# Patient Record
Sex: FEMALE | Race: WHITE | NOT HISPANIC OR LATINO | Employment: OTHER | URBAN - METROPOLITAN AREA
[De-identification: names, ages, dates, MRNs, and addresses within clinical notes are randomized per-mention and may not be internally consistent; named-entity substitution may affect disease eponyms.]

---

## 2023-04-25 ENCOUNTER — OFFICE VISIT (OUTPATIENT)
Dept: PRIMARY CARE | Facility: CLINIC | Age: 73
End: 2023-04-25
Payer: MEDICARE

## 2023-04-25 ENCOUNTER — LAB (OUTPATIENT)
Dept: LAB | Facility: LAB | Age: 73
End: 2023-04-25
Payer: MEDICARE

## 2023-04-25 VITALS
TEMPERATURE: 97.2 F | SYSTOLIC BLOOD PRESSURE: 158 MMHG | HEIGHT: 62 IN | BODY MASS INDEX: 20.06 KG/M2 | RESPIRATION RATE: 16 BRPM | DIASTOLIC BLOOD PRESSURE: 80 MMHG | WEIGHT: 109 LBS

## 2023-04-25 DIAGNOSIS — Z00.00 HEALTHCARE MAINTENANCE: ICD-10-CM

## 2023-04-25 DIAGNOSIS — I10 PRIMARY HYPERTENSION: Primary | ICD-10-CM

## 2023-04-25 DIAGNOSIS — I10 PRIMARY HYPERTENSION: ICD-10-CM

## 2023-04-25 PROBLEM — H43.813 PVD (POSTERIOR VITREOUS DETACHMENT), BOTH EYES: Status: ACTIVE | Noted: 2023-04-25

## 2023-04-25 PROBLEM — Z85.3 HISTORY OF BREAST CANCER: Status: ACTIVE | Noted: 2023-04-25

## 2023-04-25 PROBLEM — R79.89 ABNORMAL CBC: Status: ACTIVE | Noted: 2023-04-25

## 2023-04-25 PROBLEM — R79.89 LOW VITAMIN D LEVEL: Status: ACTIVE | Noted: 2023-04-25

## 2023-04-25 PROBLEM — H52.203 MYOPIA OF BOTH EYES WITH ASTIGMATISM AND PRESBYOPIA: Status: ACTIVE | Noted: 2023-04-25

## 2023-04-25 PROBLEM — G57.60 MORTON'S NEUROMA: Status: ACTIVE | Noted: 2023-04-25

## 2023-04-25 PROBLEM — D75.89 MACROCYTOSIS: Status: ACTIVE | Noted: 2023-04-25

## 2023-04-25 PROBLEM — R10.11 COLICKY RUQ ABDOMINAL PAIN: Status: ACTIVE | Noted: 2023-04-25

## 2023-04-25 PROBLEM — E78.00 HYPERCHOLESTEROLEMIA: Status: ACTIVE | Noted: 2023-04-25

## 2023-04-25 PROBLEM — H52.4 MYOPIA OF BOTH EYES WITH ASTIGMATISM AND PRESBYOPIA: Status: ACTIVE | Noted: 2023-04-25

## 2023-04-25 PROBLEM — M79.673 FOOT PAIN: Status: ACTIVE | Noted: 2023-04-25

## 2023-04-25 PROBLEM — H25.13 NUCLEAR SCLEROSIS OF BOTH EYES: Status: ACTIVE | Noted: 2023-04-25

## 2023-04-25 PROBLEM — N95.2 VAGINAL ATROPHY: Status: ACTIVE | Noted: 2023-04-25

## 2023-04-25 PROBLEM — K21.9 GERD (GASTROESOPHAGEAL REFLUX DISEASE): Status: ACTIVE | Noted: 2023-04-25

## 2023-04-25 PROBLEM — L90.9 PLANTAR FAT PAD ATROPHY: Status: ACTIVE | Noted: 2023-04-25

## 2023-04-25 PROBLEM — H52.13 MYOPIA OF BOTH EYES WITH ASTIGMATISM AND PRESBYOPIA: Status: ACTIVE | Noted: 2023-04-25

## 2023-04-25 PROBLEM — Z85.828 HISTORY OF SKIN CANCER: Status: ACTIVE | Noted: 2023-04-25

## 2023-04-25 PROBLEM — E55.9 MILD VITAMIN D DEFICIENCY: Status: ACTIVE | Noted: 2023-04-25

## 2023-04-25 LAB
ALANINE AMINOTRANSFERASE (SGPT) (U/L) IN SER/PLAS: 18 U/L (ref 7–45)
ALBUMIN (G/DL) IN SER/PLAS: 4.5 G/DL (ref 3.4–5)
ALKALINE PHOSPHATASE (U/L) IN SER/PLAS: 49 U/L (ref 33–136)
ANION GAP IN SER/PLAS: 10 MMOL/L (ref 10–20)
ASPARTATE AMINOTRANSFERASE (SGOT) (U/L) IN SER/PLAS: 25 U/L (ref 9–39)
BASOPHILS (10*3/UL) IN BLOOD BY AUTOMATED COUNT: 0.02 X10E9/L (ref 0–0.1)
BASOPHILS/100 LEUKOCYTES IN BLOOD BY AUTOMATED COUNT: 0.6 % (ref 0–2)
BILIRUBIN TOTAL (MG/DL) IN SER/PLAS: 0.8 MG/DL (ref 0–1.2)
CALCIUM (MG/DL) IN SER/PLAS: 9.6 MG/DL (ref 8.6–10.3)
CARBON DIOXIDE, TOTAL (MMOL/L) IN SER/PLAS: 31 MMOL/L (ref 21–32)
CHLORIDE (MMOL/L) IN SER/PLAS: 98 MMOL/L (ref 98–107)
CHOLESTEROL (MG/DL) IN SER/PLAS: 257 MG/DL (ref 0–199)
CHOLESTEROL IN HDL (MG/DL) IN SER/PLAS: 121.8 MG/DL
CHOLESTEROL/HDL RATIO: 2.1
CREATININE (MG/DL) IN SER/PLAS: 0.84 MG/DL (ref 0.5–1.05)
EOSINOPHILS (10*3/UL) IN BLOOD BY AUTOMATED COUNT: 0.1 X10E9/L (ref 0–0.4)
EOSINOPHILS/100 LEUKOCYTES IN BLOOD BY AUTOMATED COUNT: 3 % (ref 0–6)
ERYTHROCYTE DISTRIBUTION WIDTH (RATIO) BY AUTOMATED COUNT: 12.2 % (ref 11.5–14.5)
ERYTHROCYTE MEAN CORPUSCULAR HEMOGLOBIN CONCENTRATION (G/DL) BY AUTOMATED: 32.6 G/DL (ref 32–36)
ERYTHROCYTE MEAN CORPUSCULAR VOLUME (FL) BY AUTOMATED COUNT: 105 FL (ref 80–100)
ERYTHROCYTES (10*6/UL) IN BLOOD BY AUTOMATED COUNT: 4.11 X10E12/L (ref 4–5.2)
GFR FEMALE: 73 ML/MIN/1.73M2
GLUCOSE (MG/DL) IN SER/PLAS: 100 MG/DL (ref 74–99)
HEMATOCRIT (%) IN BLOOD BY AUTOMATED COUNT: 43.3 % (ref 36–46)
HEMOGLOBIN (G/DL) IN BLOOD: 14.1 G/DL (ref 12–16)
IMMATURE GRANULOCYTES/100 LEUKOCYTES IN BLOOD BY AUTOMATED COUNT: 0.3 % (ref 0–0.9)
LDL: 127 MG/DL (ref 0–99)
LEUKOCYTES (10*3/UL) IN BLOOD BY AUTOMATED COUNT: 3.4 X10E9/L (ref 4.4–11.3)
LYMPHOCYTES (10*3/UL) IN BLOOD BY AUTOMATED COUNT: 1.14 X10E9/L (ref 0.8–3)
LYMPHOCYTES/100 LEUKOCYTES IN BLOOD BY AUTOMATED COUNT: 34 % (ref 13–44)
MONOCYTES (10*3/UL) IN BLOOD BY AUTOMATED COUNT: 0.41 X10E9/L (ref 0.05–0.8)
MONOCYTES/100 LEUKOCYTES IN BLOOD BY AUTOMATED COUNT: 12.2 % (ref 2–10)
NEUTROPHILS (10*3/UL) IN BLOOD BY AUTOMATED COUNT: 1.67 X10E9/L (ref 1.6–5.5)
NEUTROPHILS/100 LEUKOCYTES IN BLOOD BY AUTOMATED COUNT: 49.9 % (ref 40–80)
PLATELETS (10*3/UL) IN BLOOD AUTOMATED COUNT: 258 X10E9/L (ref 150–450)
POTASSIUM (MMOL/L) IN SER/PLAS: 4.1 MMOL/L (ref 3.5–5.3)
PROTEIN TOTAL: 7.2 G/DL (ref 6.4–8.2)
SODIUM (MMOL/L) IN SER/PLAS: 135 MMOL/L (ref 136–145)
THYROTROPIN (MIU/L) IN SER/PLAS BY DETECTION LIMIT <= 0.05 MIU/L: 1.37 MIU/L (ref 0.44–3.98)
TRIGLYCERIDE (MG/DL) IN SER/PLAS: 41 MG/DL (ref 0–149)
UREA NITROGEN (MG/DL) IN SER/PLAS: 16 MG/DL (ref 6–23)
VLDL: 8 MG/DL (ref 0–40)

## 2023-04-25 PROCEDURE — 99213 OFFICE O/P EST LOW 20 MIN: CPT | Performed by: INTERNAL MEDICINE

## 2023-04-25 PROCEDURE — 93000 ELECTROCARDIOGRAM COMPLETE: CPT | Performed by: INTERNAL MEDICINE

## 2023-04-25 PROCEDURE — 3079F DIAST BP 80-89 MM HG: CPT | Performed by: INTERNAL MEDICINE

## 2023-04-25 PROCEDURE — 80061 LIPID PANEL: CPT

## 2023-04-25 PROCEDURE — 1159F MED LIST DOCD IN RCRD: CPT | Performed by: INTERNAL MEDICINE

## 2023-04-25 PROCEDURE — 1036F TOBACCO NON-USER: CPT | Performed by: INTERNAL MEDICINE

## 2023-04-25 PROCEDURE — 84443 ASSAY THYROID STIM HORMONE: CPT

## 2023-04-25 PROCEDURE — 36415 COLL VENOUS BLD VENIPUNCTURE: CPT

## 2023-04-25 PROCEDURE — 3077F SYST BP >= 140 MM HG: CPT | Performed by: INTERNAL MEDICINE

## 2023-04-25 PROCEDURE — G0439 PPPS, SUBSEQ VISIT: HCPCS | Performed by: INTERNAL MEDICINE

## 2023-04-25 PROCEDURE — 80053 COMPREHEN METABOLIC PANEL: CPT

## 2023-04-25 PROCEDURE — 1170F FXNL STATUS ASSESSED: CPT | Performed by: INTERNAL MEDICINE

## 2023-04-25 PROCEDURE — 85025 COMPLETE CBC W/AUTO DIFF WBC: CPT

## 2023-04-25 PROCEDURE — 1160F RVW MEDS BY RX/DR IN RCRD: CPT | Performed by: INTERNAL MEDICINE

## 2023-04-25 RX ORDER — OMEPRAZOLE 40 MG/1
40 CAPSULE, DELAYED RELEASE ORAL
COMMUNITY
Start: 2023-02-07 | End: 2023-08-09 | Stop reason: SDUPTHER

## 2023-04-25 RX ORDER — ESTRADIOL 0.1 MG/G
CREAM VAGINAL
COMMUNITY
Start: 2017-09-08 | End: 2024-03-18 | Stop reason: SDUPTHER

## 2023-04-25 RX ORDER — ACETAMINOPHEN 500 MG
TABLET ORAL
COMMUNITY

## 2023-04-25 ASSESSMENT — ENCOUNTER SYMPTOMS
DIARRHEA: 0
BLOOD IN STOOL: 0
ACTIVITY CHANGE: 0
CONSTIPATION: 0
SHORTNESS OF BREATH: 0
ARTHRALGIAS: 0
ABDOMINAL PAIN: 0

## 2023-04-25 ASSESSMENT — ACTIVITIES OF DAILY LIVING (ADL)
BATHING: INDEPENDENT
DRESSING: INDEPENDENT
GROCERY_SHOPPING: INDEPENDENT
TAKING_MEDICATION: INDEPENDENT
DOING_HOUSEWORK: INDEPENDENT
MANAGING_FINANCES: INDEPENDENT

## 2023-04-25 ASSESSMENT — PATIENT HEALTH QUESTIONNAIRE - PHQ9
1. LITTLE INTEREST OR PLEASURE IN DOING THINGS: NOT AT ALL
SUM OF ALL RESPONSES TO PHQ9 QUESTIONS 1 AND 2: 0
2. FEELING DOWN, DEPRESSED OR HOPELESS: NOT AT ALL

## 2023-04-25 NOTE — PROGRESS NOTES
Patient here for a Medicare Wellness visit and follow up    Subjective   Patient ID: Natalee Clemens is a 72 y.o. female who presents for Follow-up and Medicare Annual Wellness Visit Subsequent.    The patient is concerned about her cognitive health, and requests a test to establish her baseline.    The patient has a sphygmomanometer at home and notes that the majority of readings are within the normal range with yesterday's value being 120/70 mmHg.  She denies any chest pain or chest pressure.    The patient is currently looking for a referral to Obstetrics/Gynecology, as she was discharged by Alexandra BARNARD.    She is checking her BP at home.  She is generally getting very good readings.  Systolic was in the 120s a few days ago.      The patient experienced one bout of Covid-19 that was successfully treated with Paxlovid.  She has since recovered and denies any dyspnea.    The patient denies any hearing impairment or vision changes and follows regularly with Ophthalmology.  She reports relatively good sleep quality and energy levels.  She denies any abdominal pain, hematochezia, melena, bowel problems, or joint pain.     The patient is grieving the recent loss of her sister who passed away from complications due to dementia, COPD, and cardiac disease.      Review of Systems   Constitutional:  Negative for activity change.   HENT:  Negative for hearing loss.    Eyes:  Negative for visual disturbance.   Respiratory:  Negative for shortness of breath.    Cardiovascular:  Negative for chest pain.   Gastrointestinal:  Negative for abdominal pain, blood in stool, constipation and diarrhea.   Musculoskeletal:  Negative for arthralgias.       Objective   Physical Exam  Constitutional:       Appearance: Normal appearance.   Cardiovascular:      Rate and Rhythm: Normal rate and regular rhythm.      Heart sounds: Normal heart sounds.   Pulmonary:      Effort: Pulmonary effort is normal.      Breath sounds: Normal  breath sounds.   Abdominal:      General: Bowel sounds are normal.      Palpations: Abdomen is soft.      Tenderness: There is no abdominal tenderness.   Skin:     General: Skin is warm and dry.   Neurological:      General: No focal deficit present.      Mental Status: She is alert and oriented to person, place, and time. Mental status is at baseline.   Psychiatric:         Mood and Affect: Mood normal.         Behavior: Behavior normal.         Assessment/Plan   Problem List Items Addressed This Visit       Hypertension - Primary    Relevant Orders    Tsh With Reflex To Free T4 If Abnormal    Lipid panel    Comprehensive metabolic panel    CBC and Auto Differential    ECG 12 lead (Clinic Performed)     Other Visit Diagnoses       Healthcare maintenance        Relevant Orders    Referral to Gynecology            Medicare Wellness Examination Done  -  Discussed healthy diet and regular exercise.    -  Physical exam overall unremarkable. Immunizations reviewed and updated accordingly. Healthy lifestyle choices discussed (tobacco avoidance, appropriate alcohol use, avoidance of illicit substances).   -  Patient is wearing seatbelt.   -  Screening lab work ordered as indicated.    -  Age appropriate screening tests reviewed with patient.       EKG was unremarkable.    IMPRESSIONS/PLAN:    Hx of Breast Cancer   - s/p right lumpectomy.  Last Mammogram 10/2022 showed no evidence of malignancy.  MRI breast 4/2022 showed suspicious 1.5cm mass at lumpectomy surgical bed with additional suspicious mass anterior at middle depth.  Previously followed with AKIL Canales in Oncology.  Ordered referral to Obstetrics/Gynecology.    /80 today in office.  Likely aberrant reading.  Values at home have been within the normal range. Monitor for now.     HLD   - Cholesterol borderline high per 8/2022 lipid panel, patient advised to continue to lower levels through healthy diet and regular physical activity.      GERD   - EGD  3/2022, biopsies negative, symptoms stable on omeprazole 40mg QD.    Foot Pain   - Following with .     Health Maintenance   - Routine labs ordered including CBC, CMP, and a lipid panel to be completed in the fasting state.  Added TSH. Last Mammogram 10/2022. Last colonoscopy performed 4/2018, due for repeat 2028.  Scored 30/30 on MMSE per patient request.     Follow up in 6 months, call sooner if needed.        Scribe Attestation  By signing my name below, I, Robert Rodgers   attest that this documentation has been prepared under the direction and in the presence of Vignesh Martinez DO.

## 2023-08-09 DIAGNOSIS — K21.9 GASTROESOPHAGEAL REFLUX DISEASE WITHOUT ESOPHAGITIS: Primary | ICD-10-CM

## 2023-08-09 RX ORDER — OMEPRAZOLE 40 MG/1
40 CAPSULE, DELAYED RELEASE ORAL
Qty: 90 CAPSULE | Refills: 3 | Status: SHIPPED | OUTPATIENT
Start: 2023-08-09 | End: 2023-08-10 | Stop reason: SDUPTHER

## 2023-08-10 DIAGNOSIS — K21.9 GASTROESOPHAGEAL REFLUX DISEASE WITHOUT ESOPHAGITIS: ICD-10-CM

## 2023-08-10 RX ORDER — OMEPRAZOLE 40 MG/1
40 CAPSULE, DELAYED RELEASE ORAL
Qty: 90 CAPSULE | Refills: 3 | Status: SHIPPED | OUTPATIENT
Start: 2023-08-10

## 2023-09-11 LAB
RBC, URINE: 1 /HPF (ref 0–5)
SQUAMOUS EPITHELIAL CELLS, URINE: 2 /HPF
WBC, URINE: 1 /HPF (ref 0–5)

## 2023-09-12 LAB — URINE CULTURE: NO GROWTH

## 2023-10-16 ENCOUNTER — TELEPHONE (OUTPATIENT)
Dept: OBSTETRICS AND GYNECOLOGY | Facility: CLINIC | Age: 73
End: 2023-10-16

## 2023-10-26 ENCOUNTER — ANCILLARY PROCEDURE (OUTPATIENT)
Dept: RADIOLOGY | Facility: CLINIC | Age: 73
End: 2023-10-26
Payer: MEDICARE

## 2023-10-26 DIAGNOSIS — Z12.31 ENCOUNTER FOR SCREENING MAMMOGRAM FOR MALIGNANT NEOPLASM OF BREAST: ICD-10-CM

## 2023-10-26 PROCEDURE — 77063 BREAST TOMOSYNTHESIS BI: CPT | Mod: BILATERAL PROCEDURE | Performed by: STUDENT IN AN ORGANIZED HEALTH CARE EDUCATION/TRAINING PROGRAM

## 2023-10-26 PROCEDURE — 77067 SCR MAMMO BI INCL CAD: CPT

## 2023-10-26 PROCEDURE — 77067 SCR MAMMO BI INCL CAD: CPT | Mod: BILATERAL PROCEDURE | Performed by: STUDENT IN AN ORGANIZED HEALTH CARE EDUCATION/TRAINING PROGRAM

## 2023-10-27 ENCOUNTER — TELEPHONE (OUTPATIENT)
Dept: OBSTETRICS AND GYNECOLOGY | Facility: CLINIC | Age: 73
End: 2023-10-27
Payer: MEDICARE

## 2023-10-27 NOTE — TELEPHONE ENCOUNTER
Office verbally called Rx for estradiol vaginal cream into pt pharmacy. 42.5g tube with 3RF's. Sig: insert 1g vaginally twice a week. LDVM informing pt. Advised to contact pharm for  today.

## 2023-11-30 ENCOUNTER — TREATMENT (OUTPATIENT)
Dept: PHYSICAL THERAPY | Facility: CLINIC | Age: 73
End: 2023-11-30
Payer: MEDICARE

## 2023-11-30 DIAGNOSIS — N39.41 URGE INCONTINENCE: ICD-10-CM

## 2023-11-30 DIAGNOSIS — R39.15 URINARY URGENCY: Primary | ICD-10-CM

## 2023-11-30 PROCEDURE — 97162 PT EVAL MOD COMPLEX 30 MIN: CPT | Mod: GP | Performed by: PHYSICAL THERAPIST

## 2023-11-30 PROCEDURE — 97140 MANUAL THERAPY 1/> REGIONS: CPT | Mod: GP | Performed by: PHYSICAL THERAPIST

## 2023-11-30 PROCEDURE — 97110 THERAPEUTIC EXERCISES: CPT | Mod: GP | Performed by: PHYSICAL THERAPIST

## 2023-11-30 NOTE — PROGRESS NOTES
Physical Therapy Evaluation and Treatment      Patient Name: Natalee Clemens  MRN: [id number]  Today's Date: 12/1/2023  Time Calculation  Start Time: 1015  Stop Time: 1120  Time Calculation (min): 65 min      INSURANCE:  [place]  [facility]    Non-Surgical Referring Provider: Dr. Miriam Singer    ASSESSMENT:  PT Assessment Results: decreased knowledge of HEP, activity limitations, ADLs/IADLs/self care skills, flexibility, motor function/control/tone, pain, participation restrictions, range of motion/joint mobility, strength, posture, transfers, coordination, balance, gait/locomotion.  Rehab Prognosis: Good  Evaluation/Treatment Tolerance: Patient tolerated treatment well    Nataele Clemens is a 73 y.o. female presenting to the clinic with Urinary urgency and incontinence. Noted vaginal atrophy upon examination as well and educated to continue using estradiol cream prescribed by her physician. Pt demonstrates decreased ROM and strength of the pelvis/B hips and abdominals causing pain and dysfunction with toileting, bending, and lifting. Pt was given and reviewed HEP including stretching. Pt will benefit from skilled PT in order to increase ROM and strength of the pelvis/B hips and abdominals so that the pt can perform ADLs without pain and return to PLOF.     PLAN:  Treatments/Interventions: gait training, dry needling, edema control, education/instruction, home program, self care/home management, manual therapy, neuromuscular re-education, therapeutic activities, therapeutic exercises, modalities, therapeutic elastic taping.   PT Plan: Skilled PT  Duration: 1-2 times per week for 10 visits  Certification Period Start Date: 11/30/23  Certification Period End Date: 02/28/24  Rehab Potential: Good  Plan of Care Agreement: Patient    Goals -    STG - After about 6 weeks:  Pt will be able to manage changes in intra-abdominal pressure with appropriate pelvic floor and TA muscle activation.  Pt will be able to  "coordinate pelvic floor with thoracic diaphragm during functional activities that cause symptoms.  Pt will increase by 1 MMT grade for B hips.  Pt will decrease urinary leakage episodes to less than once a day.    LTG - after about 12 weeks:  Pt will report less than a 0-2/10 pain at the worst.  Pt will have at least 4+/5 strength for B hips.   Pt will have AROM to WFL for the lumbar spine.   Pt will decrease urinary leakage episodes to less than once a week.  Pt will report a significant improvement with Female NIH-CPSI score by 5 points (MDC).  Pt will be independent with progressed HEP.    CURRENT PROBLEM:   1. Urinary urgency  Follow Up In Physical Therapy      2. Urge incontinence  Follow Up In Physical Therapy          Subjective      PELVIC HISTORY:  History of Current Episode/Date Onset -   Pt states that she has noticed that she has had a lot more urgency lately with urination. Pt states that she sometimes does not make it to the bathroom. Pt reports that it has been on and off for the past 5 years. Pt now has to wear a pad and her symptoms are always worse in the morning, and that she usually has to get up at about 4 in the morning with symptoms. Pt does report that she does make \"just in case trips to the bathroom\". Pt does have \"Key in the door\" urgency as well.    Mechanism of Injury -    Insidious Onset    Pelvic Pain -   Since onset, the symptoms are:   Sometimes her clitoris can be irritated  Is on estrogen cream 2x a week  History of back pain: Yes, but better since retired    Pain Location: Lumbar   Pain Best: 0/10  Pain Today: 0/10  Pain Worst: 2/10   Pain Type: Intermittent, Achy/Dull  Lifting or Gardening    Pelvic Exercise -   Do you do Kegels? No   Current exercise regime: Gama, walking    Bladder Hx -   Excessive Urinary Urgency: Yes  Water Consumption a day: 5-6 cups (2 cups of coffee in the morning)   Daytime Voiding Frequency: morning 6 times, 3 times afternoon  Nighttime Voiding " Frequency: 1-2 times  Unintentional urine loss frequency: 1-2 times a day (in the morning)  Leakage occurs with: urgency, key in door, running water  Leakage amount: small  Do you push to urinate: No  Able to completely empty bladder: Yes  Frequent UTI's: No    Bowel Hx -   Excessive Bowel Urgency: No  BM Frequency: Mostly once a day  Frequent Diarrhea: No  Frequent constipation/straining/incomplete emptying: Sometimes  Supplements: Flaxseed    Work -   Work Status: Retired    Home Living -    Stairs in Home: 1-2 flight with railing  Pt lives with her     Patient Stated Goals - Control her urine better    PRECAUTIONS -    None    Prior Level of Function -    I with ADLs/IADLs     Objective     Hip AROM (degrees) - WFL grossly except IR and extension    Hip MMT (/5) -  R hip Flexion: 4   R hip ER: 3+   R hip IR: 4-   R hip Abduction: 4-   L hip Flexion: 4    L hip ER: 3+   L hip IR: 4-   L hip Abduction: 4-     Lumbar AROM by Percent -  Lumbar Flexion: 80%   Lumbar Extension: 30%  R Lumbar Side Bendin%  L Lumbar Side Bendin%    Posture -   Increased Lumbar Lordosis  Sway Back Posture    Functional Assessment -   SLS: Trendelenburg positive B    Hip/Lumbar Special Tests -  PEPITO positive bilateral with pain  Ramon positive bilateral  Slump negative bilateral  Active SLR: Compression Pelvis more difficulty, Compression Abdominals improved    Flexibility -   Decreased Tissue Length of: Iliopsoas, Adductors, HS (B)    Palpation - Consent to External Palpation Verbally Given  Pelvic Alignment: R anteriorly rotated innominate - improved with Shotgun/MET    Internal Palpation Exam - Consent to Pelvic Floor Internal Exam Verbally Given, Performed using universal precautions/gloves    Pelvic Floor External Visual Observation -   Contraction with Anal [place]: reduced  Bearing Down with distension around anus: reduced  Cough with Contraction: Slight Bulge (Pt was taught a pre-activation of pelvic  floor/Knack)    External Palpation -   Perineal Body: no TTP  Superficial Transverse Perineal Muscles: (R) no TTP; (L) no TTP    Visual Observation -   Good Tissue Color: Yes  Guillory of Clitoris has good movement: No, noted vaginal atrophy  Alignment of Perineal Body in relation to ischial tuberosities: middle    Internal Palpation -   Ischiocavernosus: (R) mild TTP; (L) mild TTP  Bulbocavernosus: (R) mild TTP; (L) moderate TTP  Periurethrals: (R) mild TTP; (L) moderate TTP  Levator Ani: (R) mild TTP; (L) moderate TTP  Obturator Internus: (R) mild TTP; (L) moderate TTP    Pelvic Floor Strength - P / E / R // F   Power MVC: 3 /5  Endurance MVC (up to 10 sec): 2 sec  Repetitions of Endurance MVC with 4 second break: 4  Fast Twitch (up to 10 reps): dnt    Outcome Measures -   Other Measures  Other Outcome Measures: Female NIH-CPSI: 16 (Pain 3, Urinary 8, QOL 5)     Treatment -   Evaluation -   Moderate (91714)  Therapeutic Exercise (42574) -     Piriformis Stretch: Reviewed  Butterfly Stretch: Reviewed  Reviewed Urge Control pointers  Manual Therapy (88217) -    Internal Assessment with gentle TPR    OP Patient Education -   Pt was given handouts for Urge Control, Bladder Irritants, and HEP with piriformis/butterfly stretches.

## 2023-12-01 PROBLEM — N39.41 URGE INCONTINENCE: Status: ACTIVE | Noted: 2023-12-01

## 2023-12-01 PROBLEM — R39.15 URINARY URGENCY: Status: ACTIVE | Noted: 2023-12-01

## 2023-12-01 ASSESSMENT — PATIENT HEALTH QUESTIONNAIRE - PHQ9
SUM OF ALL RESPONSES TO PHQ9 QUESTIONS 1 AND 2: 0
1. LITTLE INTEREST OR PLEASURE IN DOING THINGS: NOT AT ALL
2. FEELING DOWN, DEPRESSED OR HOPELESS: NOT AT ALL

## 2023-12-11 ENCOUNTER — TREATMENT (OUTPATIENT)
Dept: PHYSICAL THERAPY | Facility: CLINIC | Age: 73
End: 2023-12-11
Payer: MEDICARE

## 2023-12-11 DIAGNOSIS — N39.41 URGE INCONTINENCE: Primary | ICD-10-CM

## 2023-12-11 PROCEDURE — 97110 THERAPEUTIC EXERCISES: CPT | Mod: GP,CQ

## 2023-12-11 ASSESSMENT — PAIN SCALES - GENERAL: PAINLEVEL_OUTOF10: 0 - NO PAIN

## 2023-12-11 ASSESSMENT — PAIN - FUNCTIONAL ASSESSMENT: PAIN_FUNCTIONAL_ASSESSMENT: 0-10

## 2023-12-11 NOTE — PROGRESS NOTES
Physical Therapy Treatment    Patient Name: Natalee Clemens  MRN: [id number]  Today's Date: 12/11/2023  Time Calculation  Start Time: 0703  Stop Time: 0745  Time Calculation (min): 42 min  Insurance   [place] 2[place]  [facility] [place]  Current Problem:  1. Urge incontinence          Subjective:  I have been doing my exercises and I am not sure if I am doing the leg cross over stretch correct.  Pain:  Pain Assessment: 0-10  Pain Score: 0 - No pain    Objective:  Static Balance  R SLS ~ 2 sec with dynamic valgus  L SLS  ~  3sec with dynamic valgus  Precautions  Precautions  Medical Precautions: No known precautions/limitation    Treatments:  Therapeutic Exercise 74221: Unit(s)-3  Supine Hipflexor x 30sec R/L x 2  Supine Posterior Pelvic Tilt  3 position bridge x 5 each  Weight shifting forward/backward stepping x 10 R/L and L/R   Supine TA with BKFO  x10  SL Clamshell x 10 R/L  Quadruped TA 1x10  Quadruped TA with Bird dog 1x10  Reviewed and Updated HEP  Assessment: Patient demonstrated good exercise technique and improved mobility following session.  Plan: Continue to work on improving SLS control.

## 2023-12-19 ENCOUNTER — TREATMENT (OUTPATIENT)
Dept: PHYSICAL THERAPY | Facility: CLINIC | Age: 73
End: 2023-12-19
Payer: MEDICARE

## 2023-12-19 DIAGNOSIS — N39.41 URGE INCONTINENCE: Primary | ICD-10-CM

## 2023-12-19 PROCEDURE — 97140 MANUAL THERAPY 1/> REGIONS: CPT | Mod: CQ,GP

## 2023-12-19 ASSESSMENT — PAIN - FUNCTIONAL ASSESSMENT: PAIN_FUNCTIONAL_ASSESSMENT: 0-10

## 2023-12-19 ASSESSMENT — PAIN SCALES - GENERAL: PAINLEVEL_OUTOF10: 0 - NO PAIN

## 2023-12-19 NOTE — PROGRESS NOTES
"      Physical Therapy Treatment    Patient Name: Natalee Clemens  MRN: [id number]  Today's Date: 12/19/2023  Time Calculation  Start Time: 1216  Stop Time: 1301  Time Calculation (min): 45 min  Insurance   Visit number: 3/10  [facility]  Current Problem:  1. Urge incontinence            Subjective:  I have been doing the hip flexor stretch on my bed and its not firm enough.  Pain:  Pain Assessment: 0-10  Pain Score: 0 - No pain    Objective:  Not tested  Precautions  Precautions  Medical Precautions: No known precautions/limitation  Treatments:  Therapeutic Exercise 85163: Unit(s)-3  Bike-Recumbent x 6min [place] with TA  TG B LE Leg Press level 6 2x10 SDR 4  SLS with contralateral hip abd/er isometric x 5 x5\" hold  R/L  Lateral Step Up/Down 4\" block x10 R/L  Standing Sumo Squats x 10 x 5\"hold  SLS on Airex x 15\"x3 R/L  3 position bridge x5 each  SL Clamshell x 10 R/L  SL Hip Abd 1x10 R/L  Quadruped TA with Bird dog 1x10  Assessment: Patient demonstrated good technique with exercises. Patient was challenged with SLS activities.  Plan: Continue to work on improving SLS control and endurance.  "

## 2023-12-28 ENCOUNTER — TREATMENT (OUTPATIENT)
Dept: PHYSICAL THERAPY | Facility: CLINIC | Age: 73
End: 2023-12-28
Payer: MEDICARE

## 2023-12-28 DIAGNOSIS — R39.15 URINARY URGENCY: ICD-10-CM

## 2023-12-28 DIAGNOSIS — N39.41 URGE INCONTINENCE: ICD-10-CM

## 2023-12-28 PROCEDURE — 97112 NEUROMUSCULAR REEDUCATION: CPT | Mod: GP | Performed by: PHYSICAL THERAPIST

## 2023-12-28 PROCEDURE — 97140 MANUAL THERAPY 1/> REGIONS: CPT | Mod: GP | Performed by: PHYSICAL THERAPIST

## 2023-12-28 NOTE — PROGRESS NOTES
Physical Therapy Treatment    Patient Name: Natalee Clemens  MRN: [id number]  Today's Date: 12/28/2023  Time Calculation  Start Time: 1110  Stop Time: 1212  Time Calculation (min): 62 min    INSURANCE:  Visit number: 4/10  [facility]    Non-Surgical   Referring Provider: Dr. Miriam Singer     CURRENT PROBLEM:  1. Urinary urgency  Follow Up In Physical Therapy      2. Urge incontinence  Follow Up In Physical Therapy        SUBJECTIVE:   General -   Pt is doing home exercises.  Pretty good   Urge control handout is helping a lot  Hang of the exercises     Feels like she is getting stronger with her other exercises    No leaking this week   Little better with the urgency, now more moderate urges    Pain -    No pain, just some vaginal dryness    Precautions -    None    OBJECTIVE:   Treatment -   Manual Therapy (27179) -    Gentle TPR to ischiocavernosus, bulbocavernosus, and paraurethrals  Neuro Reeducation (41435) -    Prometheus CTS 1500 BioFeedback -   Baseline Supine: 1.8  5 sec Work, 10 sec Rest (20 times): Av Rise 7.0, Av Rest 1.7  2 sec Work, 4 sec Rest (20 times): Av Rise 8.8, Av Rest 2.9  Attempted E-stim, but pt reported felt uncomfortable so held    OP Patient Education -   Lubricants Handout    ASSESSMENT:     TPR performed to pelvic floor musculature with good tolerance. Pt was introduced to biofeedback which she tolerated well, but did not like e-stim so held. Pt was given and reviewed a lubricants handout for reduced dryness during intercourse. Pt is benefiting from strengthening exercises. Pt tolerated session well and is progressing towards functional needs. Continue to progress pt within tolerance and POC.    PLAN:    Continue to progress pt within tolerance.

## 2023-12-29 ENCOUNTER — TREATMENT (OUTPATIENT)
Dept: PHYSICAL THERAPY | Facility: CLINIC | Age: 73
End: 2023-12-29
Payer: MEDICARE

## 2023-12-29 DIAGNOSIS — R39.15 URINARY URGENCY: ICD-10-CM

## 2023-12-29 DIAGNOSIS — N39.41 URGE INCONTINENCE: ICD-10-CM

## 2023-12-29 PROCEDURE — 97110 THERAPEUTIC EXERCISES: CPT | Mod: GP,CQ

## 2023-12-29 ASSESSMENT — PAIN - FUNCTIONAL ASSESSMENT: PAIN_FUNCTIONAL_ASSESSMENT: 0-10

## 2023-12-29 ASSESSMENT — PAIN SCALES - GENERAL: PAINLEVEL_OUTOF10: 0 - NO PAIN

## 2023-12-29 NOTE — PROGRESS NOTES
"      Physical Therapy Treatment    Patient Name: Natalee Clemens  MRN: [id number]  Today's Date: 12/29/2023  Time Calculation  Start Time: 0747  Stop Time: 0830  Time Calculation (min): 43 min  Insurance   [facility]: 5/[facility] [place]  Current Problem:  1. Urinary urgency  Follow Up In Physical Therapy      2. Urge incontinence  Follow Up In Physical Therapy          Subjective:  I have been busy around the holidays and have house guests, so I have not been regular with HEP  Pain:  Pain Assessment: 0-10  Pain Score: 0 - No pain    Objective:  Balance Static  R SLS~3sec  L SLS ~3sec  With dynamic valgus present.  Precautions  Precautions  Medical Precautions: No known precautions/limitation  Treatments:  Therapeutic Exercise 98566: Unit(s)-3  Bike-Recumbent x 6min [place] with TA  TG B LE Leg Press level 6 3x10 SDR 4 with isometric hip abd vs red band  Lateral Step Up/Down 4\" block x10x2 R/L  Standing Sumo Squats x 15 x 5\"hold  Standing Hipflexor stretch on chair x 30 sec R/L  SLS on Airex x 30\"x3 R/L  SLS with contralateral hip abd/er isometric x5 x5\" hold  3 position bridge x5 each  SL Clamshell x 10 R/L  SL Hip Abd 1x10 R/L  SL Hip ER 1x10 R/L  Assessment: Patient was challenged with SLS activities and with bridges.  Plan: Continue to work on improving SLS and dynamic control.   "

## 2024-01-04 ENCOUNTER — TREATMENT (OUTPATIENT)
Dept: PHYSICAL THERAPY | Facility: CLINIC | Age: 74
End: 2024-01-04
Payer: MEDICARE

## 2024-01-04 DIAGNOSIS — N39.41 URGE INCONTINENCE: ICD-10-CM

## 2024-01-04 DIAGNOSIS — R39.15 URINARY URGENCY: ICD-10-CM

## 2024-01-04 PROCEDURE — 97140 MANUAL THERAPY 1/> REGIONS: CPT | Mod: GP | Performed by: PHYSICAL THERAPIST

## 2024-01-04 PROCEDURE — 97110 THERAPEUTIC EXERCISES: CPT | Mod: GP | Performed by: PHYSICAL THERAPIST

## 2024-01-04 PROCEDURE — 97112 NEUROMUSCULAR REEDUCATION: CPT | Mod: GP | Performed by: PHYSICAL THERAPIST

## 2024-01-04 NOTE — PROGRESS NOTES
Physical Therapy Treatment    Patient Name: Natalee Clemens  MRN: [id number]  Today's Date: 1/4/2024  Time Calculation  Start Time: 1006  Stop Time: 1110  Time Calculation (min): 64 min    INSURANCE:  Visit number: 6/10  [facility]    Referring Provider: Dr. Miriam Singer     CURRENT PROBLEM:  1. Urinary urgency  Follow Up In Physical Therapy      2. Urge incontinence  Follow Up In Physical Therapy        SUBJECTIVE:   General -   Pt is doing home exercises.  Soreness after last visit, did not like the stim.  Tried a different lubricant which was more slippery.  Feels like she is walking more upright with better strength.    Pain -    Pain Location: Irritated Vaginal Tissue (0-1/10), B hips muscle soreness from exercises.    Precautions -    None    OBJECTIVE:   Treatment -   Therapeutic Exercise (88530) -  Hip IR/ER Wipers   TA press isometric: 5 sec holds, 1 min  Seated TA B hip AB with ball: RTB x10  Supine Happy Baby: 1 min  Manual Therapy (99202) -    Internal gentle TPR to B bulbocavernosus and periurethrals, and L obturator internus  Neuro Reeducation (39789) -    Prometheus CTS 1500 BioFeedback -   Baseline Supine: 3.7  5 sec Work, 10 sec Rest (20 times): Av Rise 9.9, Av Rest 3.5  2 sec Work, 4 sec Rest (20 times): Av Rise 11.6, Av Rest 4.8  5 sec Work, 10 sec Rest (20 times): Av Rise 10.5, Av Rest 2.9    OP Patient Education -   Access Code: [email or web address]  URL: [email or web address]  Date: 01/04/2024  Prepared by: Fabiana Saha    Exercises  - Supine Hip Internal and External Rotation  - 1-2 x daily - 2 sets - 10 reps - 2-3 sec hold  - Abdominal Press into Ball  - 1-2 x daily - 5 sec hold  - Seated Hip Internal Rotation with Ball and Resistance  - 1-2 x daily - 2-3 sets - 10 reps - 2 sec hold  - Seated Happy Baby With Trunk Flexion For Pelvic Relaxation  - 1-2 x daily - 1 min hold    ASSESSMENT:     Pt was introduced to more hip stretching and hip/abdominal exercises for home. Internal gentle TPR  performed for reduced spasm, which more noted on the L side today. Pt was taught happy baby positions with breath for improved pelvic floor relaxation. Continued with biofeedback this visit, which she required cueing for sustained holds and improved pelvic floor relaxation. Pt tolerated session well and is progressing towards functional needs. Continue to progress pt within tolerance and POC.    PLAN:    Continue to progress pt within tolerance.

## 2024-01-05 ENCOUNTER — TREATMENT (OUTPATIENT)
Dept: PHYSICAL THERAPY | Facility: CLINIC | Age: 74
End: 2024-01-05
Payer: MEDICARE

## 2024-01-05 DIAGNOSIS — N39.41 URGE INCONTINENCE: Primary | ICD-10-CM

## 2024-01-05 DIAGNOSIS — R39.15 URINARY URGENCY: ICD-10-CM

## 2024-01-05 PROCEDURE — 97110 THERAPEUTIC EXERCISES: CPT | Mod: GP,CQ

## 2024-01-05 ASSESSMENT — PAIN SCALES - GENERAL: PAINLEVEL_OUTOF10: 0 - NO PAIN

## 2024-01-05 ASSESSMENT — PAIN - FUNCTIONAL ASSESSMENT: PAIN_FUNCTIONAL_ASSESSMENT: 0-10

## 2024-01-05 NOTE — PROGRESS NOTES
"      Physical Therapy Treatment    Patient Name: Natalee Clemens  MRN: [id number]  Today's Date: 1/5/2024     Insurance   [facility] [place]  Current Problem:  1. Urge incontinence  Follow Up In Physical Therapy      2. Urinary urgency  Follow Up In Physical Therapy        Subjective:  My hip has been feeling pretty good.  I have some soreness at times but resolves with rest.  Pain:  Pain Assessment: 0-10  Pain Score: 0 - No pain  Objective:  Dynamic Balance  R Forward Step and Hold~ 3sec  L Forward Step and Hold ~ 2sec  With dynamic valgus present.  Precautions  Precautions  Medical Precautions: No known precautions/limitation  Treatments:  Therapeutic Exercise 09339: Unit(s)-3  Bike-Recumbent x 6min [place] with TA  TG B LE Leg Press level 6 3x10 SDR 5 with isometric hip abd vs blue  band  TG R/L Leg Press level 4 2x10 -eccentric quad  Lateral walk @ ballet barre length x 3 vs red band around ankles  Anterior Step Up/Down 6\" block with contralateral UE 1# raise x 10  Lateral Step Up/Down 4\" block x15 R/L  Supine HF stretch-modified odilon x 30 sec R/L  Supine Hamstring stretch x 30sec x2 R/L  Supine QS with SLR 1x10 R/L  3 position bridge x5 each  SL Clamshell x 10 R/L  SL Hip Abd 1x10 R/L  Manual Therapy 21285: Unit(s)  L Hip Long axis distraction   L Knee Patellar Mobs Inf/Sup glide x 10 x2  Assessment: Observed decreased LE stabilization L>R  Plan: Continue to work on improving core and LE stabilization  "

## 2024-01-11 ENCOUNTER — TREATMENT (OUTPATIENT)
Dept: PHYSICAL THERAPY | Facility: CLINIC | Age: 74
End: 2024-01-11
Payer: MEDICARE

## 2024-01-11 DIAGNOSIS — R39.15 URINARY URGENCY: ICD-10-CM

## 2024-01-11 DIAGNOSIS — N39.41 URGE INCONTINENCE: ICD-10-CM

## 2024-01-11 PROCEDURE — 97110 THERAPEUTIC EXERCISES: CPT | Mod: GP | Performed by: PHYSICAL THERAPIST

## 2024-01-11 PROCEDURE — 97112 NEUROMUSCULAR REEDUCATION: CPT | Mod: GP | Performed by: PHYSICAL THERAPIST

## 2024-01-11 PROCEDURE — 97140 MANUAL THERAPY 1/> REGIONS: CPT | Mod: GP | Performed by: PHYSICAL THERAPIST

## 2024-01-11 NOTE — PROGRESS NOTES
Physical Therapy Treatment    Patient Name: Natalee Clemens  MRN: [id number]  Today's Date: 1/11/2024  Time Calculation  Start Time: 1008  Stop Time: 1106  Time Calculation (min): 58 min    INSURANCE:  Visit number: 8/10  [facility]     Referring Provider: Dr. Miriam Singer     CURRENT PROBLEM:  1. Urinary urgency  Follow Up In Physical Therapy      2. Urge incontinence  Follow Up In Physical Therapy        SUBJECTIVE:   General -   Pt is doing home exercises.  Pt states that she feels like she is walking better. Pt states that she gets some soreness in her PF after her exercises. Pt states that her adductors are still giving her a lot of trouble. Pt states that her urgency has been better, but had one bad day this week and thinks it was due to a bladder irritant. Pt had one leak this week when she did not make it to the bathroom on time.    Pain -    No pain    Precautions -    Vaginal Atrophy    OBJECTIVE:   Treatment -   Therapeutic Exercise (24066) -  Kneeling Hip Flexor Stretch: 30 sec x2   Seated Straight Leg Adductor Stretch: 30 sec x2   Hip IR/ER Wipers --  TA press isometric: --  Seated TA B hip AB with ball: --  Supine Happy Baby: --  Manual Therapy (63133) -    STM and gentle cupping glides to B Adductors  Neuro Reeducation (88798) -    Prometheus CTS 1500 BioFeedback -   Baseline Supine: 1.8  5 sec Work, 10 sec Rest (20 times): Av Rise 6.8, Av Rest 2.3  2 sec Work, 4 sec Rest (20 times): Av Rise 6.7, Av Rest 2.6  5 sec Work, 10 sec Rest (20 times): Av Rise 6.2, Av Rest 1.7    OP Patient Education -   Access Code: [email or web address]  URL: [email or web address]  Updated: 01/11/2024  Added Exercises:  - Half Kneeling Hip Flexor Stretch  - 1-2 x daily - 3 sets - 30 sec hold  - Seated Hip Adductor Stretch  - 1-2 x daily - 3 sets - 30 sec - 1 min hold    ASSESSMENT:     Continued with biofeedback for improved proprioception and ability to contract and relax pelvic floor musculature. STM and cupping was  performed to adductors this visit for reduced pain and spasm. Pt was introduced to more stretching for hip flexor and adductors for good carry over of manual techniques. Pt tolerated session well and is progressing towards functional needs. Continue to progress pt within tolerance and POC.    PLAN:    Continue to progress pt within tolerance. Pt has two visits left on current POC.

## 2024-01-18 ENCOUNTER — TREATMENT (OUTPATIENT)
Dept: PHYSICAL THERAPY | Facility: CLINIC | Age: 74
End: 2024-01-18
Payer: MEDICARE

## 2024-01-18 DIAGNOSIS — R39.15 URINARY URGENCY: ICD-10-CM

## 2024-01-18 DIAGNOSIS — N39.41 URGE INCONTINENCE: ICD-10-CM

## 2024-01-18 PROCEDURE — 97110 THERAPEUTIC EXERCISES: CPT | Mod: GP | Performed by: PHYSICAL THERAPIST

## 2024-01-18 PROCEDURE — 97140 MANUAL THERAPY 1/> REGIONS: CPT | Mod: GP | Performed by: PHYSICAL THERAPIST

## 2024-01-18 NOTE — PROGRESS NOTES
Physical Therapy Treatment    Patient Name: Natalee Clemens  MRN: [id number]  Today's Date: 2024  Time Calculation  Start Time: 020  Stop Time: 030  Time Calculation (min): 58 min    INSURANCE:  Visit number: 9/10  [facility]     Referring Provider: Dr. Miriam Singer     CURRENT PROBLEM:  1. Urinary urgency  Follow Up In Physical Therapy    Follow Up In Physical Therapy      2. Urge incontinence  Follow Up In Physical Therapy    Follow Up In Physical Therapy        SUBJECTIVE:   General -   Pt is doing home exercises.  Pt states that she has more leaking this week (Thinks due to the cold).  More constipated also this week.  Pt had a spasm in L hip during intercourse.    Urgency is improving a little  Overall stronger in vagina and hips    Pain -    Pain Location: L vagina  Pain Best: 0/10  Pain Today: 0/10  Pain Worst: 1/10     Precautions -    Vaginal Atrophy    OBJECTIVE:   Hip AROM (degrees) - WFL grossly except extension     Hip MMT (/5) -  R hip Flexion: 4+   R hip ER: 3+   R hip IR: 4  R hip Abduction: 4  R hip Adduction: 4+ with slight pain  L hip Flexion: 4+    L hip ER: 4-  L hip IR: 4  L hip Abduction: 4  L hip Adduction: 4+    Lumbar AROM by Percent -  Lumbar Flexion: 100%   Lumbar Extension: 50%  R Lumbar Side Bendin%  L Lumbar Side Bendin%     Posture -   Increased Lumbar Lordosis  Reduced Sway Back Posture     Functional Assessment -   SLS: Trendelenburg positive R only slightly     Flexibility -   Decreased Tissue Length of: Iliopsoas, Adductors, HS (B)     Palpation - Consent to External Palpation Verbally Given  Pelvic Alignment: R anteriorly rotated innominate - improved with Shotgun/MET     Internal Palpation Exam - Consent to Pelvic Floor Internal Exam Verbally Given, Performed using universal precautions/gloves     External Palpation -   Perineal Body: no TTP  Superficial Transverse Perineal Muscles: (R) no TTP; (L) no TTP     Visual Observation -   Guillory of Clitoris has good  movement: No; noted vaginal atrophy     Internal Palpation -   Ischiocavernosus: (R) no TTP; (L) no TTP  Bulbocavernosus: (R) mild TTP; (L) moderate TTP  Periurethrals: (R) mild TTP; (L) moderate TTP  Levator Ani: (R) moderate TTP; (L) moderate TTP  Obturator Internus: (R) no TTP; (L) no TTP     Pelvic Floor Strength - P / E / R // F   Power MVC: 3 /5  Endurance MVC (up to 10 sec): 3 sec  Repetitions of Endurance MVC with 4 second break: 4  Fast Twitch (up to 10 reps): dnt    Outcome Measures -   Other Measures  Other Outcome Measures: Female NIH-CPSI: 14 (Pain 5, Urinary 4, QOL 5)    Treatment -   Therapeutic Exercise (73214) -  Assessment  Supine Modified Happy Baby with Breath: 1 min  IR/ER Wipers: 2 min  Reviewed Constipation management  Kneeling Hip Flexor Stretch: --  Seated Straight Leg Adductor Stretch: reviewed  Hip IR/ER Wipers --  TA press isometric: --  Seated TA B hip AB with ball: --    Manual Therapy (88703) -    Internal Assessment and gentle TPR    OP Patient Education -   Access Code: [email or web address]  URL: [email or web address]    Correct Elimination Position handout  Colonic ILU Massage Handout  Recommended a pelvic wand    ASSESSMENT:  PT Assessment  Rehab Prognosis: Good  Evaluation/Treatment Tolerance: Patient tolerated treatment well    Pt is progressing towards her goals and demonstrates improvements with both ROM and strength at this time. Pt reports an increased ability to perform ADLs, but still exhibits deficits as shown by her NIH-CPSI score. Pt will benefit from continued skilled PT in order to further improve ROM and strength of the pelvis/B hips and abdominals so that the pt can perform ADLs without pain and return to PLOF.    PLAN:   Treatments/Interventions: mechanical traction, edema control, education/instruction, home program, self care/home management, manual therapy, neuromuscular re-education, therapeutic activities, therapeutic exercises, modalities,  therapeutic elastic taping.   OP PT Plan  PT Plan: Skilled PT  PT Frequency: 1 time per week  Duration: 6 visits  Certification Period Start Date: 01/18/24  Certification Period End Date: 04/17/24  Rehab Potential: Good  Plan of Care Agreement: Patient    Goals -   STG - After about 6 weeks:  Pt will be able to manage changes in intra-abdominal pressure with appropriate pelvic floor and TA muscle activation. (Goal MET)   Pt will be able to coordinate pelvic floor with thoracic diaphragm during functional activities that cause symptoms. (Goal MET)   Pt will increase by 1 MMT grade for B hips. (Goal PROGRESSING)    Pt will decrease urinary leakage episodes to less than once a day. (Goal MET)      LTG - after about 12 weeks:  Pt will report less than a 0-2/10 pain at the worst. (Goal PROGRESSING)    Pt will have at least 4+/5 strength for B hips. (Goal PROGRESSING)    Pt will have AROM to WFL for the lumbar spine. (Goal PROGRESSING)    Pt will decrease urinary leakage episodes to less than once a week. (Goal PROGRESSING)    Pt will report a significant improvement with Female NIH-CPSI score by 5 points (MDC). (Goal PROGRESSING)    Pt will be independent with progressed HEP. (Goal PROGRESSING)

## 2024-03-18 ENCOUNTER — OFFICE VISIT (OUTPATIENT)
Dept: OBSTETRICS AND GYNECOLOGY | Facility: CLINIC | Age: 74
End: 2024-03-18
Payer: MEDICARE

## 2024-03-18 VITALS
DIASTOLIC BLOOD PRESSURE: 60 MMHG | WEIGHT: 111 LBS | BODY MASS INDEX: 20.96 KG/M2 | HEIGHT: 61 IN | SYSTOLIC BLOOD PRESSURE: 116 MMHG

## 2024-03-18 DIAGNOSIS — Z78.0 OSTEOPENIA AFTER MENOPAUSE: ICD-10-CM

## 2024-03-18 DIAGNOSIS — N95.2 VAGINAL ATROPHY: ICD-10-CM

## 2024-03-18 DIAGNOSIS — M85.80 OSTEOPENIA AFTER MENOPAUSE: ICD-10-CM

## 2024-03-18 DIAGNOSIS — Z12.31 ENCOUNTER FOR SCREENING MAMMOGRAM FOR MALIGNANT NEOPLASM OF BREAST: ICD-10-CM

## 2024-03-18 DIAGNOSIS — N32.81 OAB (OVERACTIVE BLADDER): Primary | ICD-10-CM

## 2024-03-18 PROCEDURE — 1157F ADVNC CARE PLAN IN RCRD: CPT | Performed by: NURSE PRACTITIONER

## 2024-03-18 PROCEDURE — 1159F MED LIST DOCD IN RCRD: CPT | Performed by: NURSE PRACTITIONER

## 2024-03-18 PROCEDURE — 99213 OFFICE O/P EST LOW 20 MIN: CPT | Performed by: NURSE PRACTITIONER

## 2024-03-18 PROCEDURE — 3078F DIAST BP <80 MM HG: CPT | Performed by: NURSE PRACTITIONER

## 2024-03-18 PROCEDURE — 1160F RVW MEDS BY RX/DR IN RCRD: CPT | Performed by: NURSE PRACTITIONER

## 2024-03-18 PROCEDURE — 3074F SYST BP LT 130 MM HG: CPT | Performed by: NURSE PRACTITIONER

## 2024-03-18 PROCEDURE — 1036F TOBACCO NON-USER: CPT | Performed by: NURSE PRACTITIONER

## 2024-03-18 RX ORDER — ESTRADIOL 0.1 MG/G
CREAM VAGINAL
Qty: 42.5 G | Refills: 2 | Status: SHIPPED | OUTPATIENT
Start: 2024-03-18

## 2024-03-19 NOTE — PROGRESS NOTES
"HISTORY OF PRESENT ILLNESS:  Natalee Clemens is a 73 y.o. female, who presents in follow up for OAB     During last encounter on 9/2023, reviewed and agreed to the following:  Begin PFPT     Today she reports   - She is very pleased with PFPT results, has also started being more mindful about bladder irritants. Has noticed particularly Vitamin B is irritating to her bladder. With these changes, bladder is no longer bothersome for her.  - Uses vaginal estrogen twice weekly           PHYSICAL EXAMINATION:  No LMP recorded. Patient is postmenopausal.  Body mass index is 20.97 kg/m².  /60   Ht 1.549 m (5' 1\")   Wt 50.3 kg (111 lb)   BMI 20.97 kg/m²     Physical Exam  Constitutional:       Appearance: Normal appearance.   HENT:      Head: Normocephalic.   Pulmonary:      Effort: Pulmonary effort is normal.   Neurological:      Mental Status: She is alert and oriented to person, place, and time.   Psychiatric:         Mood and Affect: Mood normal.         Behavior: Behavior normal.         IMPRESSION AND PLAN:  Natalee Clemens is a 73 y.o. who presents in follow up for OAB, osteopenia, breast cancer screening and vaginal atrophy     Diagnoses:   #1 OAB  #2 Vaginal atrophy   #3 Osteopenia  #4 Breast cancer screening     Plan:   OAB  - Managed with PFPT and lifestyle changes    2. Vaginal atrophy   - Refilled TV estrogen cream for use twice weekly    Health maintenance:  - Dexa scan and mammo ordered     All questions and concerns were answered and addressed.  The patient expressed understanding and agrees with the plan.      Follow up 1 year or sooner, PRN    Scribe Attestation  By signing my name below, IStefanie , Robert   attest that this documentation has been prepared under the direction and in the presence of AKIL Murray.      I, Jessi Bone, personally performed the services described in the documentation as scribed in my presence and confirm it is both complete and " accurate.

## 2024-03-21 ENCOUNTER — TREATMENT (OUTPATIENT)
Dept: PHYSICAL THERAPY | Facility: CLINIC | Age: 74
End: 2024-03-21
Payer: MEDICARE

## 2024-03-21 DIAGNOSIS — N39.41 URGE INCONTINENCE: ICD-10-CM

## 2024-03-21 DIAGNOSIS — R39.15 URINARY URGENCY: ICD-10-CM

## 2024-03-21 PROCEDURE — 97110 THERAPEUTIC EXERCISES: CPT | Mod: GP | Performed by: PHYSICAL THERAPIST

## 2024-03-21 NOTE — PROGRESS NOTES
Physical Therapy Treatment    Patient Name: Natalee Clemens  MRN: [id number]  Today's Date: 3/21/2024  Time Calculation  Start Time: 207  Stop Time: 255  Time Calculation (min): 48 min       PT Therapeutic Procedures Time Entry  Therapeutic Exercise Time Entry: 45                   INSURANCE:  [facility]     Referring Provider: Dr. Miriam Singer     CURRENT PROBLEM:  1. Urinary urgency  Follow Up In Physical Therapy      2. Urge incontinence  Follow Up In Physical Therapy        SUBJECTIVE:   General -   Pt is doing home exercises.  Doing really well and thinks this might be her last visit  Realized that she can't take her B vitamin with her coffee and takes it later   Less bladder irritation     Thinks PT really helped.    Learning the Kegels helped.    Pain -    No pain    Precautions -    Vaginal Atrophy    OBJECTIVE:   Hip AROM (degrees) - WFL grossly except extension     Hip MMT (/5) -  R hip Flexion: 4+   R hip ER: 4  R hip IR: 4+  R hip Abduction: 4  R hip Adduction: 4+  L hip Flexion: 4+    L hip ER: 4  L hip IR: 4+  L hip Abduction: 4  L hip Adduction: 4+     Lumbar AROM by Percent -  Lumbar Flexion: 100%   Lumbar Extension: 50%  R Lumbar Side Bendin%  L Lumbar Side Bendin%     Outcome Measures -   Other Measures  Other Outcome Measures: Female NIH-CPSI: 3 (Pain 0, Urinary 1, QOL 2)    Treatment -   Therapeutic Exercise (90333) -  Assessment   Reviewed adductor stretches  Seated TA isometric: 5 sec x5   Clamshells: x10  Reviewed full HEP with patient    OP Patient Education -   Access Code: [email or web address]  URL: [email or web address]  Updated: 2024  Prepared by: Fabiana Saha  Added Exercises  - Seated Multifidi Isometric  - 1-2 x daily - 2 sets - 10 reps - 5 sec  hold  - Supine Hip Adduction Isometric with Ball  - 1 x daily - 2 sets - 10 reps - 5 hold  - Hooklying Sacroiliac Joint Isometric (Mirrored)  - 1 x daily - 2 sets - 10 reps - 5 sec  hold    ASSESSMENT:  PT Assessment  Rehab Prognosis: Good  Evaluation/Treatment Tolerance: Patient tolerated treatment well    Pt has progressed well towards her goals and demonstrates improvements with both ROM and strength of the pelvis/B hips and abdominals/lumbar spine at this time. Pt reports an increased ability to perform ADLs, as shown by her female NIH-CPSI score. Pt was given and reviewed an updated HEP. Pt will therefore be discharged from skilled PT at this time and can call with any further questions/concerns.    PLAN:   OP PT Plan  PT Plan: No Additional PT interventions required at this time  Rehab Potential: Good  Plan of Care Agreement: Patient  Discharge pt from current episode of care.     Goals -   STG - After about 6 weeks:  Pt will be able to manage changes in intra-abdominal pressure with appropriate pelvic floor and TA muscle activation. (Goal MET)   Pt will be able to coordinate pelvic floor with thoracic diaphragm during functional activities that cause symptoms. (Goal MET)   Pt will increase by 1 MMT grade for B hips. (Goal MET)   Pt will decrease urinary leakage episodes to less than once a day. (Goal MET)      LTG - after about 12 weeks:  Pt will report less than a 0-2/10 pain at the worst. (Goal MET)   Pt will have at least 4+/5 strength for B hips. (Goal PROGRESSING)    Pt will have AROM to WFL for the lumbar spine. (Goal PROGRESSING)    Pt will decrease urinary leakage episodes to less than once a week. (Goal MET)   Pt will report a significant improvement with Female NIH-CPSI score by 5 points (MDC). (Goal MET)   Pt will be independent with progressed HEP. (Goal MET)

## 2024-03-28 ENCOUNTER — APPOINTMENT (OUTPATIENT)
Dept: PHYSICAL THERAPY | Facility: CLINIC | Age: 74
End: 2024-03-28
Payer: MEDICARE

## 2024-04-04 ENCOUNTER — APPOINTMENT (OUTPATIENT)
Dept: PHYSICAL THERAPY | Facility: CLINIC | Age: 74
End: 2024-04-04
Payer: MEDICARE

## 2024-04-09 ENCOUNTER — HOSPITAL ENCOUNTER (OUTPATIENT)
Dept: RADIOLOGY | Facility: CLINIC | Age: 74
Discharge: HOME | End: 2024-04-09
Payer: MEDICARE

## 2024-04-09 DIAGNOSIS — M85.80 OSTEOPENIA AFTER MENOPAUSE: ICD-10-CM

## 2024-04-09 DIAGNOSIS — Z78.0 OSTEOPENIA AFTER MENOPAUSE: ICD-10-CM

## 2024-04-09 PROCEDURE — 77080 DXA BONE DENSITY AXIAL: CPT

## 2024-04-11 ENCOUNTER — APPOINTMENT (OUTPATIENT)
Dept: PHYSICAL THERAPY | Facility: CLINIC | Age: 74
End: 2024-04-11
Payer: MEDICARE

## 2024-04-23 ENCOUNTER — OFFICE VISIT (OUTPATIENT)
Dept: PRIMARY CARE | Facility: CLINIC | Age: 74
End: 2024-04-23
Payer: MEDICARE

## 2024-04-23 VITALS
BODY MASS INDEX: 20.43 KG/M2 | OXYGEN SATURATION: 98 % | TEMPERATURE: 97.9 F | WEIGHT: 111 LBS | RESPIRATION RATE: 16 BRPM | DIASTOLIC BLOOD PRESSURE: 60 MMHG | HEIGHT: 62 IN | SYSTOLIC BLOOD PRESSURE: 118 MMHG | HEART RATE: 72 BPM

## 2024-04-23 DIAGNOSIS — R41.3 MEMORY LOSS: ICD-10-CM

## 2024-04-23 DIAGNOSIS — I10 PRIMARY HYPERTENSION: Primary | ICD-10-CM

## 2024-04-23 PROCEDURE — 1170F FXNL STATUS ASSESSED: CPT | Performed by: INTERNAL MEDICINE

## 2024-04-23 PROCEDURE — 1157F ADVNC CARE PLAN IN RCRD: CPT | Performed by: INTERNAL MEDICINE

## 2024-04-23 PROCEDURE — 3074F SYST BP LT 130 MM HG: CPT | Performed by: INTERNAL MEDICINE

## 2024-04-23 PROCEDURE — 3078F DIAST BP <80 MM HG: CPT | Performed by: INTERNAL MEDICINE

## 2024-04-23 PROCEDURE — G0439 PPPS, SUBSEQ VISIT: HCPCS | Performed by: INTERNAL MEDICINE

## 2024-04-23 PROCEDURE — 1160F RVW MEDS BY RX/DR IN RCRD: CPT | Performed by: INTERNAL MEDICINE

## 2024-04-23 PROCEDURE — 1159F MED LIST DOCD IN RCRD: CPT | Performed by: INTERNAL MEDICINE

## 2024-04-23 PROCEDURE — 1036F TOBACCO NON-USER: CPT | Performed by: INTERNAL MEDICINE

## 2024-04-23 ASSESSMENT — ENCOUNTER SYMPTOMS
DIARRHEA: 0
CONSTIPATION: 0
SHORTNESS OF BREATH: 0
BACK PAIN: 1
SLEEP DISTURBANCE: 0
ABDOMINAL PAIN: 0

## 2024-04-23 ASSESSMENT — ACTIVITIES OF DAILY LIVING (ADL)
GROCERY_SHOPPING: INDEPENDENT
BATHING: INDEPENDENT
MANAGING_FINANCES: INDEPENDENT
DOING_HOUSEWORK: INDEPENDENT
TAKING_MEDICATION: INDEPENDENT
DRESSING: INDEPENDENT

## 2024-04-23 ASSESSMENT — PATIENT HEALTH QUESTIONNAIRE - PHQ9
1. LITTLE INTEREST OR PLEASURE IN DOING THINGS: NOT AT ALL
2. FEELING DOWN, DEPRESSED OR HOPELESS: NOT AT ALL
SUM OF ALL RESPONSES TO PHQ9 QUESTIONS 1 AND 2: 0

## 2024-04-23 NOTE — PROGRESS NOTES
Patient here for a medicare wellness visit and follow up     Subjective   Patient ID: Natalee Clemens is a 73 y.o. female who presents for Medicare Annual Wellness Visit Subsequent and Follow-up.    The patient reports back pain with sudden onset since 4/16/2024 sustained when helping family into her car.  Symptoms are improving with time.    The patient notes occasional knee pain but denies any other joint pain.    The patient notes ongoing short term memory loss, and finds herself writing things down to prevent forgetting.  She inquires about a Neuropsychological Assessment.    The patient notes mild hearing impairment that is tolerable to date.    The patient reports good sleep quality and energy levels.  She denies any dyspnea, chest pain, chest pressure, abdominal pain, bowel problems, or lower limb edema.       Review of Systems   HENT:  Positive for hearing loss.    Respiratory:  Negative for shortness of breath.    Cardiovascular:  Negative for chest pain and leg swelling.   Gastrointestinal:  Negative for abdominal pain, constipation and diarrhea.   Musculoskeletal:  Positive for back pain.        Positive for knee pain.   Neurological:         Positive for memory loss.   Psychiatric/Behavioral:  Negative for sleep disturbance.        Objective   Physical Exam  Constitutional:       Appearance: Normal appearance.   Neck:      Vascular: No carotid bruit.   Cardiovascular:      Rate and Rhythm: Normal rate and regular rhythm.      Heart sounds: Normal heart sounds.   Pulmonary:      Effort: Pulmonary effort is normal.      Breath sounds: Normal breath sounds.   Abdominal:      General: Bowel sounds are normal.      Palpations: Abdomen is soft.      Tenderness: There is no abdominal tenderness.   Skin:     General: Skin is warm and dry.   Neurological:      General: No focal deficit present.      Mental Status: She is alert and oriented to person, place, and time. Mental status is at baseline.   Psychiatric:          Mood and Affect: Mood normal.         Behavior: Behavior normal.         Assessment/Plan   Problem List Items Addressed This Visit             ICD-10-CM    Hypertension - Primary I10    Relevant Orders    Lipid panel    CBC and Auto Differential    Comprehensive metabolic panel    Tsh With Reflex To Free T4 If Abnormal     Other Visit Diagnoses         Codes    Memory loss     R41.3    Relevant Orders    Referral to Adult Neuropsychology            Medicare Wellness Examination Done  -  Discussed healthy diet and regular exercise.    -  Physical exam overall unremarkable. Immunizations reviewed and updated accordingly. Healthy lifestyle choices discussed (tobacco avoidance, appropriate alcohol use, avoidance of illicit substances).   -  Patient is wearing seatbelt.   -  Screening lab work ordered as indicated.    -  Age appropriate screening tests reviewed with patient.       IMPRESSIONS/PLAN:     Memory Loss  - Ongoing.  Seems only short term.  Scored 30/30 on MMSE per 7/2023.  Ordered referral to Adult Neuropsychology.    /60 today in office.  Values at home have been within the normal range.      HLD   - Cholesterol high per 4/2023 lipid panel, patient advised to continue to lower levels through healthy diet and regular physical activity.      GERD   - EGD 3/2022, biopsies negative, symptoms stable on omeprazole 40mg QD.     Hx of Breast Cancer   - s/p right lumpectomy.  Last Mammogram 10/2022 showed no evidence of malignancy.  MRI breast 4/2022 showed suspicious 1.5cm mass at lumpectomy surgical bed with additional suspicious mass anterior at middle depth.  Previously followed with FELISHA Canales-CNP in Oncology.  Following with  from Obstetrics/Gynecology.    Foot Pain   - Following with .     Health Maintenance   - Routine labs ordered including CBC, CMP, and a lipid panel to be completed in the fasting state. Added TSH. Last Mammogram 10/2023. Last colonoscopy performed  4/2018, due for repeat 2028.       Follow up in 6 months, call sooner if needed.        Scribe Attestation  By signing my name below, I, Jessica Estrada, Robert   attest that this documentation has been prepared under the direction and in the presence of Vignesh Martinez DO.   Jessica Estrada 04/23/24 8:12 AM

## 2024-04-30 ENCOUNTER — LAB (OUTPATIENT)
Dept: LAB | Facility: LAB | Age: 74
End: 2024-04-30
Payer: MEDICARE

## 2024-04-30 DIAGNOSIS — I10 PRIMARY HYPERTENSION: ICD-10-CM

## 2024-04-30 LAB
ALBUMIN SERPL BCP-MCNC: 4.1 G/DL (ref 3.4–5)
ALP SERPL-CCNC: 51 U/L (ref 33–136)
ALT SERPL W P-5'-P-CCNC: 17 U/L (ref 7–45)
ANION GAP SERPL CALC-SCNC: 9 MMOL/L (ref 10–20)
AST SERPL W P-5'-P-CCNC: 25 U/L (ref 9–39)
BASOPHILS # BLD AUTO: 0.02 X10*3/UL (ref 0–0.1)
BASOPHILS NFR BLD AUTO: 0.6 %
BILIRUB SERPL-MCNC: 0.8 MG/DL (ref 0–1.2)
BUN SERPL-MCNC: 17 MG/DL (ref 6–23)
CALCIUM SERPL-MCNC: 9.3 MG/DL (ref 8.6–10.6)
CHLORIDE SERPL-SCNC: 101 MMOL/L (ref 98–107)
CHOLEST SERPL-MCNC: 238 MG/DL (ref 0–199)
CHOLESTEROL/HDL RATIO: 2.2
CO2 SERPL-SCNC: 33 MMOL/L (ref 21–32)
CREAT SERPL-MCNC: 0.86 MG/DL (ref 0.5–1.05)
EGFRCR SERPLBLD CKD-EPI 2021: 71 ML/MIN/1.73M*2
EOSINOPHIL # BLD AUTO: 0.17 X10*3/UL (ref 0–0.4)
EOSINOPHIL NFR BLD AUTO: 5.2 %
ERYTHROCYTE [DISTWIDTH] IN BLOOD BY AUTOMATED COUNT: 12.9 % (ref 11.5–14.5)
GLUCOSE SERPL-MCNC: 99 MG/DL (ref 74–99)
HCT VFR BLD AUTO: 40.5 % (ref 36–46)
HDLC SERPL-MCNC: 106.6 MG/DL
HGB BLD-MCNC: 13.2 G/DL (ref 12–16)
IMM GRANULOCYTES # BLD AUTO: 0.01 X10*3/UL (ref 0–0.5)
IMM GRANULOCYTES NFR BLD AUTO: 0.3 % (ref 0–0.9)
LDLC SERPL CALC-MCNC: 123 MG/DL
LYMPHOCYTES # BLD AUTO: 1.36 X10*3/UL (ref 0.8–3)
LYMPHOCYTES NFR BLD AUTO: 41.6 %
MCH RBC QN AUTO: 34.5 PG (ref 26–34)
MCHC RBC AUTO-ENTMCNC: 32.6 G/DL (ref 32–36)
MCV RBC AUTO: 106 FL (ref 80–100)
MONOCYTES # BLD AUTO: 0.4 X10*3/UL (ref 0.05–0.8)
MONOCYTES NFR BLD AUTO: 12.2 %
NEUTROPHILS # BLD AUTO: 1.31 X10*3/UL (ref 1.6–5.5)
NEUTROPHILS NFR BLD AUTO: 40.1 %
NON HDL CHOLESTEROL: 131 MG/DL (ref 0–149)
NRBC BLD-RTO: 0 /100 WBCS (ref 0–0)
PLATELET # BLD AUTO: 234 X10*3/UL (ref 150–450)
POTASSIUM SERPL-SCNC: 4.3 MMOL/L (ref 3.5–5.3)
PROT SERPL-MCNC: 6.5 G/DL (ref 6.4–8.2)
RBC # BLD AUTO: 3.83 X10*6/UL (ref 4–5.2)
SODIUM SERPL-SCNC: 139 MMOL/L (ref 136–145)
TRIGL SERPL-MCNC: 44 MG/DL (ref 0–149)
TSH SERPL-ACNC: 1.71 MIU/L (ref 0.44–3.98)
VLDL: 9 MG/DL (ref 0–40)
WBC # BLD AUTO: 3.3 X10*3/UL (ref 4.4–11.3)

## 2024-04-30 PROCEDURE — 36415 COLL VENOUS BLD VENIPUNCTURE: CPT

## 2024-04-30 PROCEDURE — 80061 LIPID PANEL: CPT

## 2024-04-30 PROCEDURE — 85025 COMPLETE CBC W/AUTO DIFF WBC: CPT

## 2024-04-30 PROCEDURE — 80053 COMPREHEN METABOLIC PANEL: CPT

## 2024-04-30 PROCEDURE — 84443 ASSAY THYROID STIM HORMONE: CPT

## 2024-06-13 ENCOUNTER — APPOINTMENT (OUTPATIENT)
Dept: DERMATOLOGY | Facility: CLINIC | Age: 74
End: 2024-06-13
Payer: MEDICARE

## 2024-06-13 DIAGNOSIS — L57.0 ACTINIC KERATOSIS: ICD-10-CM

## 2024-06-13 DIAGNOSIS — L82.1 SEBORRHEIC KERATOSIS: ICD-10-CM

## 2024-06-13 DIAGNOSIS — L90.5 SCAR CONDITIONS AND FIBROSIS OF SKIN: Primary | ICD-10-CM

## 2024-06-13 DIAGNOSIS — Z85.828 PERSONAL HISTORY OF OTHER MALIGNANT NEOPLASM OF SKIN: ICD-10-CM

## 2024-06-13 DIAGNOSIS — Z12.83 ENCOUNTER FOR SCREENING FOR MALIGNANT NEOPLASM OF SKIN: ICD-10-CM

## 2024-06-13 DIAGNOSIS — D22.9 MELANOCYTIC NEVUS, UNSPECIFIED LOCATION: ICD-10-CM

## 2024-06-13 DIAGNOSIS — L81.4 LENTIGO: ICD-10-CM

## 2024-06-13 DIAGNOSIS — D18.01 HEMANGIOMA OF SKIN: ICD-10-CM

## 2024-06-13 PROBLEM — C44.319 BASAL CELL CARCINOMA (BCC) OF RIGHT FOREHEAD: Status: ACTIVE | Noted: 2017-01-01

## 2024-06-13 PROCEDURE — 1157F ADVNC CARE PLAN IN RCRD: CPT | Performed by: DERMATOLOGY

## 2024-06-13 PROCEDURE — 1036F TOBACCO NON-USER: CPT | Performed by: DERMATOLOGY

## 2024-06-13 PROCEDURE — 99213 OFFICE O/P EST LOW 20 MIN: CPT | Performed by: DERMATOLOGY

## 2024-06-13 PROCEDURE — 17000 DESTRUCT PREMALG LESION: CPT | Performed by: DERMATOLOGY

## 2024-06-13 PROCEDURE — 1159F MED LIST DOCD IN RCRD: CPT | Performed by: DERMATOLOGY

## 2024-06-13 PROCEDURE — 17003 DESTRUCT PREMALG LES 2-14: CPT | Performed by: DERMATOLOGY

## 2024-06-13 NOTE — PROGRESS NOTES
Subjective   Natalee Clemens is a 73 y.o. female who presents for the following: Skin Check (Annual/).    Skin Cancer Screening  She has a history of moderate sun exposure. She is in the sun infrequently. She uses sunscreen infrequently. She reports no skin symptoms. Her moles are not changing.    Spots that concern her: none    Patient has a history of:  BCC - 07/25/16, 01/09/17  [place]  SK        Objective   Well appearing patient in no apparent distress; mood and affect are within normal limits.    A full examination was performed including scalp, head, eyes, ears, nose, lips, neck, chest, axillae, abdomen, back, buttocks, bilateral upper extremities, bilateral lower extremities, hands, feet, fingers, toes, fingernails, and toenails. All findings within normal limits unless otherwise noted below.    Scattered cherry-red papule(s).    All nevi were symmetric brown macules without atypia on dermoscopy.     Scattered tan macules in sun-exposed areas.    Stuck on verrucous, tan-brown papules and plaques.      Scar is well-healed without evidence of recurrence    Right Forehead (2)  Destruction of Actinic Keratosis Procedure Note  Diagnosis: AK  Location: see physical exam  Informed consent: Discussed risks (permanent scarring, light or dark discoloration, infection, pain, bleeding, bruising, redness, blister formation, and recurrence of the lesion) and benefits of the procedure, as well as the alternatives.  Informed consent was obtained.  Anesthesia: not required  Procedure Details:  The lesion was destroyed with liquid nitrogen. The patient tolerated procedure well.  Total number of lesions treated:  2  Plan:  The patient was instructed on post-op care. Recommend OTC analgesia as needed for pain.        Assessment/Plan   Actinic keratosis (2)  Right Forehead    Destr of lesion - Right Forehead  Complexity: simple    Destruction method: cryotherapy    Informed consent: discussed and consent obtained    Lesion destroyed  using liquid nitrogen: Yes    Cryotherapy cycles:  1  Outcome: patient tolerated procedure well with no complications    Post-procedure details: wound care instructions given      Encounter for screening for malignant neoplasm of skin    Personal history of other malignant neoplasm of skin    Hemangioma of skin    Melanocytic nevus, unspecified location    The ABCDEs of melanoma and warning signs of non-melanoma skin cancer were discussed with patient and patient expressed understanding. Sun protection and use of at least SPF 30 discussed with patient. Pt instructed to reapply every 2 hours.     Lentigo    The ABCDEs of melanoma and warning signs of non-melanoma skin cancer were discussed with patient and patient expressed understanding. Sun protection and use of at least SPF 30 discussed with patient. Pt instructed to reapply every 2 hours.     Seborrheic keratosis    Scar conditions and fibrosis of skin    Follow up in 1 year or sooner as needed.

## 2024-07-29 DIAGNOSIS — K21.9 GASTROESOPHAGEAL REFLUX DISEASE WITHOUT ESOPHAGITIS: ICD-10-CM

## 2024-07-29 RX ORDER — OMEPRAZOLE 40 MG/1
40 CAPSULE, DELAYED RELEASE ORAL
Qty: 90 CAPSULE | Refills: 0 | Status: SHIPPED | OUTPATIENT
Start: 2024-07-29

## 2024-10-30 DIAGNOSIS — K21.9 GASTROESOPHAGEAL REFLUX DISEASE WITHOUT ESOPHAGITIS: ICD-10-CM

## 2024-10-30 RX ORDER — OMEPRAZOLE 40 MG/1
40 CAPSULE, DELAYED RELEASE ORAL
Qty: 90 CAPSULE | Refills: 2 | Status: SHIPPED | OUTPATIENT
Start: 2024-10-30

## 2025-02-25 ENCOUNTER — APPOINTMENT (OUTPATIENT)
Dept: DERMATOLOGY | Facility: CLINIC | Age: 75
End: 2025-02-25
Payer: MEDICARE

## 2025-02-25 DIAGNOSIS — L90.5 SCAR CONDITIONS AND FIBROSIS OF SKIN: ICD-10-CM

## 2025-02-25 DIAGNOSIS — Z85.828 PERSONAL HISTORY OF SKIN CANCER: ICD-10-CM

## 2025-02-25 DIAGNOSIS — L82.1 SEBORRHEIC KERATOSIS: ICD-10-CM

## 2025-02-25 DIAGNOSIS — D48.5 NEOPLASM OF UNCERTAIN BEHAVIOR OF SKIN: Primary | ICD-10-CM

## 2025-02-25 PROCEDURE — 1157F ADVNC CARE PLAN IN RCRD: CPT | Performed by: STUDENT IN AN ORGANIZED HEALTH CARE EDUCATION/TRAINING PROGRAM

## 2025-02-25 PROCEDURE — 11102 TANGNTL BX SKIN SINGLE LES: CPT | Performed by: STUDENT IN AN ORGANIZED HEALTH CARE EDUCATION/TRAINING PROGRAM

## 2025-02-25 PROCEDURE — 1159F MED LIST DOCD IN RCRD: CPT | Performed by: STUDENT IN AN ORGANIZED HEALTH CARE EDUCATION/TRAINING PROGRAM

## 2025-02-25 PROCEDURE — 99213 OFFICE O/P EST LOW 20 MIN: CPT | Performed by: STUDENT IN AN ORGANIZED HEALTH CARE EDUCATION/TRAINING PROGRAM

## 2025-02-25 PROCEDURE — 11103 TANGNTL BX SKIN EA SEP/ADDL: CPT | Performed by: STUDENT IN AN ORGANIZED HEALTH CARE EDUCATION/TRAINING PROGRAM

## 2025-02-25 ASSESSMENT — DERMATOLOGY QUALITY OF LIFE (QOL) ASSESSMENT
RATE HOW EMOTIONALLY BOTHERED YOU ARE BY YOUR SKIN PROBLEM (FOR EXAMPLE, WORRY, EMBARRASSMENT, FRUSTRATION): 0 - NEVER BOTHERED
WHAT SINGLE SKIN CONDITION LISTED BELOW IS THE PATIENT ANSWERING THE QUALITY-OF-LIFE ASSESSMENT QUESTIONS ABOUT: DERMATITIS
DATE THE QUALITY-OF-LIFE ASSESSMENT WAS COMPLETED: 67206
RATE HOW BOTHERED YOU ARE BY SYMPTOMS OF YOUR SKIN PROBLEM (EG, ITCHING, STINGING BURNING, HURTING OR SKIN IRRITATION): 6 - ALWAYS BOTHERED
RATE HOW BOTHERED YOU ARE BY EFFECTS OF YOUR SKIN PROBLEMS ON YOUR ACTIVITIES (EG, GOING OUT, ACCOMPLISHING WHAT YOU WANT, WORK ACTIVITIES OR YOUR RELATIONSHIPS WITH OTHERS): 0 - NEVER BOTHERED
WHAT SINGLE SKIN CONDITION LISTED BELOW IS THE PATIENT ANSWERING THE QUALITY-OF-LIFE ASSESSMENT QUESTIONS ABOUT: DERMATITIS
RATE HOW BOTHERED YOU ARE BY SYMPTOMS OF YOUR SKIN PROBLEM (EG, ITCHING, STINGING BURNING, HURTING OR SKIN IRRITATION): 6 - ALWAYS BOTHERED
RATE HOW EMOTIONALLY BOTHERED YOU ARE BY YOUR SKIN PROBLEM (FOR EXAMPLE, WORRY, EMBARRASSMENT, FRUSTRATION): 0 - NEVER BOTHERED
RATE HOW BOTHERED YOU ARE BY EFFECTS OF YOUR SKIN PROBLEMS ON YOUR ACTIVITIES (EG, GOING OUT, ACCOMPLISHING WHAT YOU WANT, WORK ACTIVITIES OR YOUR RELATIONSHIPS WITH OTHERS): 0 - NEVER BOTHERED

## 2025-02-25 ASSESSMENT — PATIENT GLOBAL ASSESSMENT (PGA): WHAT IS THE PGA: PATIENT GLOBAL ASSESSMENT:  2 - MILD

## 2025-02-25 NOTE — PROGRESS NOTES
Subjective     Natalee Clemens is a 74 y.o. female who presents for the following: Suspicious Skin Lesion (Patient is concerned with lesion on neck which she first noticed four months ago. Hx of skin cancer: BCC 7/25/16 and 1/09/17.).     Review of Systems:  No other skin or systemic complaints other than what is documented elsewhere in the note.    The following portions of the chart were reviewed this encounter and updated as appropriate:          Skin Cancer History  No skin cancer on file.      Specialty Problems          Dermatology Problems    Basal cell carcinoma     Left Chest; ED&C 09/06/2016         Basal cell carcinoma (BCC) of right forehead     Mohs by Dr simmons 05/217         History of skin cancer    Plantar fat pad atrophy        Objective   Well appearing patient in no apparent distress; mood and affect are within normal limits.    A focused skin examination was performed. All findings within normal limits unless otherwise noted below.    Assessment/Plan   1. Neoplasm of uncertain behavior of skin (2)  Left Anterior Neck  9x4 mm pink pearly eroded papule           Lesion biopsy  Type of biopsy: tangential    Informed consent: discussed and consent obtained    Timeout: patient name, date of birth, surgical site, and procedure verified    Procedure prep:  Patient was prepped and draped  Anesthesia: the lesion was anesthetized in a standard fashion    Anesthetic:  1% lidocaine w/ epinephrine 1-100,000 local infiltration  Instrument used: DermaBlade    Hemostasis achieved with: aluminum chloride    Outcome: patient tolerated procedure well    Post-procedure details: sterile dressing applied and wound care instructions given    Dressing type: petrolatum and bandage      Staff Communication: Dermatology Local Anesthesia: 1 % Lidocaine / Epinephrine - Amount: 3 ml    Specimen 1 - Dermatopathology- DERM LAB  Differential Diagnosis: r/o bcc  Check Margins Yes/No?:    Comments:    Dermpath Lab: Routine  Histopathology (formalin-fixed tissue)    Mid Back  8 mm inflamed pink stuck on papule           Lesion biopsy  Type of biopsy: tangential    Informed consent: discussed and consent obtained    Timeout: patient name, date of birth, surgical site, and procedure verified    Procedure prep:  Patient was prepped and draped  Anesthesia: the lesion was anesthetized in a standard fashion    Anesthetic:  1% lidocaine w/ epinephrine 1-100,000 local infiltration  Instrument used: DermaBlade    Hemostasis achieved with: aluminum chloride    Outcome: patient tolerated procedure well    Post-procedure details: sterile dressing applied and wound care instructions given    Dressing type: petrolatum and bandage      Specimen 2 - Dermatopathology- DERM LAB  Differential Diagnosis: isk  Check Margins Yes/No?:    Comments:    Dermpath Lab: Routine Histopathology (formalin-fixed tissue)    Concerning lesion found on exam. DDX for lesion includes: 1-  r/o bcc. 2. Isk, painful. The need for biopsy to aid in diagnosis was discussed and recommended. Risks and benefits of biopsy were reviewed. See procedure note.    2. Personal history of skin cancer  Scar at site of prior procedure  Left chest and right forehead   NER    There is no evidence of recurrence on clinical examination today, reassurance was provided to the patient. Discussed that hx of skin cancer increases risk of developing future skin cancer.    3. Scar conditions and fibrosis of skin  Scar is well-healed without evidence of recurrence    4. Seborrheic keratosis (2)  Mid Back, Neck - Anterior  Stuck on verrucous, tan-brown papules and plaques.      The benign nature of these skin lesions were reviewed with the patient today and reassurance was provided. Patient advised to return for f/u if the lesions change in size, shape, color, become painful, tender, itch or bleed.

## 2025-02-27 LAB
LABORATORY COMMENT REPORT: NORMAL
PATH REPORT.FINAL DX SPEC: NORMAL
PATH REPORT.GROSS SPEC: NORMAL
PATH REPORT.MICROSCOPIC SPEC OTHER STN: NORMAL
PATH REPORT.RELEVANT HX SPEC: NORMAL
PATH REPORT.TOTAL CANCER: NORMAL

## 2025-02-28 DIAGNOSIS — C44.41 BASAL CELL CARCINOMA (BCC) OF LEFT SIDE OF NECK: Primary | ICD-10-CM

## 2025-02-28 NOTE — RESULT ENCOUNTER NOTE
Informed patient of biopsy results:  1. Left Anterior Neck- Basal Cell Carcinoma. Dr. Sanderson is requesting Mohs surgery for this area.  2. Mid-Back- Seborrheic Keratosis. Benign with no further treatment required.

## 2025-03-03 ENCOUNTER — TELEPHONE (OUTPATIENT)
Dept: OBSTETRICS AND GYNECOLOGY | Facility: CLINIC | Age: 75
End: 2025-03-03
Payer: MEDICARE

## 2025-03-03 DIAGNOSIS — N95.2 VAGINAL ATROPHY: ICD-10-CM

## 2025-03-03 RX ORDER — ESTRADIOL 0.1 MG/G
CREAM VAGINAL
Qty: 42.5 G | Refills: 2 | Status: SHIPPED | OUTPATIENT
Start: 2025-03-03 | End: 2025-03-03

## 2025-03-03 RX ORDER — ESTRADIOL 0.1 MG/G
CREAM VAGINAL
Qty: 42.5 G | Refills: 2 | Status: SHIPPED | OUTPATIENT
Start: 2025-03-03

## 2025-03-03 NOTE — TELEPHONE ENCOUNTER
Upon chart review. Pt due for annual visit this.  Medication order placed in system and sent to nikia to approve.

## 2025-04-09 ENCOUNTER — APPOINTMENT (OUTPATIENT)
Dept: OBSTETRICS AND GYNECOLOGY | Facility: CLINIC | Age: 75
End: 2025-04-09
Payer: MEDICARE

## 2025-04-09 VITALS
BODY MASS INDEX: 20.06 KG/M2 | WEIGHT: 109 LBS | SYSTOLIC BLOOD PRESSURE: 126 MMHG | HEIGHT: 62 IN | DIASTOLIC BLOOD PRESSURE: 78 MMHG

## 2025-04-09 DIAGNOSIS — N95.2 VAGINAL ATROPHY: ICD-10-CM

## 2025-04-09 DIAGNOSIS — Z12.31 ENCOUNTER FOR SCREENING MAMMOGRAM FOR MALIGNANT NEOPLASM OF BREAST: Primary | ICD-10-CM

## 2025-04-09 PROCEDURE — 1126F AMNT PAIN NOTED NONE PRSNT: CPT | Performed by: OBSTETRICS & GYNECOLOGY

## 2025-04-09 PROCEDURE — 99213 OFFICE O/P EST LOW 20 MIN: CPT | Performed by: OBSTETRICS & GYNECOLOGY

## 2025-04-09 PROCEDURE — 1160F RVW MEDS BY RX/DR IN RCRD: CPT | Performed by: OBSTETRICS & GYNECOLOGY

## 2025-04-09 PROCEDURE — 1157F ADVNC CARE PLAN IN RCRD: CPT | Performed by: OBSTETRICS & GYNECOLOGY

## 2025-04-09 PROCEDURE — 1036F TOBACCO NON-USER: CPT | Performed by: OBSTETRICS & GYNECOLOGY

## 2025-04-09 PROCEDURE — 1159F MED LIST DOCD IN RCRD: CPT | Performed by: OBSTETRICS & GYNECOLOGY

## 2025-04-09 PROCEDURE — G2211 COMPLEX E/M VISIT ADD ON: HCPCS | Performed by: OBSTETRICS & GYNECOLOGY

## 2025-04-09 RX ORDER — ESTRADIOL 0.1 MG/G
CREAM VAGINAL
Qty: 42.5 G | Refills: 2 | Status: SHIPPED | OUTPATIENT
Start: 2025-04-09

## 2025-04-09 RX ORDER — PREDNISOLONE ACETATE 10 MG/ML
SUSPENSION/ DROPS OPHTHALMIC
COMMUNITY

## 2025-04-09 RX ORDER — KETOROLAC TROMETHAMINE 5 MG/ML
SOLUTION OPHTHALMIC
COMMUNITY

## 2025-04-09 RX ORDER — OFLOXACIN 3 MG/ML
SOLUTION/ DROPS OPHTHALMIC
COMMUNITY
Start: 2025-03-04

## 2025-04-09 ASSESSMENT — PAIN SCALES - GENERAL: PAINLEVEL_OUTOF10: 0-NO PAIN

## 2025-04-09 NOTE — PROGRESS NOTES
"Natalee Clemens is a 74 y.o. here for med refill  HPI:   Estrace cream helps with vaginal dryness.  Sexually active.   Mohs surgery coming up  DEXA: 2024  Colonoscopy:   GynHx: menopause  Pap Hx: no problems  Social History     Substance and Sexual Activity   Sexual Activity Yes    Partners: Male    Birth control/protection: Post-menopausal     STIs: none  Exercise: zoomba 3 times per week  Past med hx and past surg hx reviewed and notable for: healthy    Objective   /78   Ht 1.575 m (5' 2\")   Wt 49.4 kg (109 lb)   BMI 19.94 kg/m²   Physical Exam  Vitals reviewed.   Constitutional:       Appearance: Normal appearance. She is obese.   Pulmonary:      Effort: Pulmonary effort is normal.   Neurological:      General: No focal deficit present.      Mental Status: She is alert.   Psychiatric:         Mood and Affect: Mood normal.         Behavior: Behavior normal.          Assessment and Plan:  Problem List Items Addressed This Visit          Ob-Gyn Problems    Vaginal atrophy    Overview     Happy with estrace. Would like to continue current dose and regimen.  Your mammogram is due.           Relevant Medications    estradiol (Estrace) 0.01 % (0.1 mg/gram) vaginal cream     Other Visit Diagnoses       Encounter for screening mammogram for malignant neoplasm of breast    -  Primary    Relevant Orders    BI mammo bilateral screening tomosynthesis           Orders Placed This Encounter   Procedures    BI mammo bilateral screening tomosynthesis     Standing Status:   Future     Standing Expiration Date:   5/9/2026     Order Specific Question:   Reason for exam:     Answer:   Screening     Order Specific Question:   Radiologist to Determine Optimal Study     Answer:   Yes     Order Specific Question:   Release result to [facility]     Answer:   Immediate [1]     Order Specific Question:   Is this exam part of a Research Study? If Yes, link this order to the research study     Answer:   No      "

## 2025-04-24 ENCOUNTER — APPOINTMENT (OUTPATIENT)
Dept: PRIMARY CARE | Facility: CLINIC | Age: 75
End: 2025-04-24
Payer: MEDICARE

## 2025-04-24 VITALS
DIASTOLIC BLOOD PRESSURE: 80 MMHG | BODY MASS INDEX: 20.43 KG/M2 | TEMPERATURE: 97.4 F | OXYGEN SATURATION: 100 % | WEIGHT: 111 LBS | HEIGHT: 62 IN | RESPIRATION RATE: 16 BRPM | HEART RATE: 62 BPM | SYSTOLIC BLOOD PRESSURE: 138 MMHG

## 2025-04-24 DIAGNOSIS — I10 PRIMARY HYPERTENSION: Primary | ICD-10-CM

## 2025-04-24 ASSESSMENT — ENCOUNTER SYMPTOMS
BLOOD IN STOOL: 0
CONSTIPATION: 0
ARTHRALGIAS: 0
ABDOMINAL PAIN: 0
SHORTNESS OF BREATH: 0
ROS SKIN COMMENTS: POSITIVE FOR SKIN LESION.
DIARRHEA: 0

## 2025-04-24 ASSESSMENT — ACTIVITIES OF DAILY LIVING (ADL)
DOING_HOUSEWORK: INDEPENDENT
DRESSING: INDEPENDENT
GROCERY_SHOPPING: INDEPENDENT
BATHING: INDEPENDENT
MANAGING_FINANCES: INDEPENDENT
TAKING_MEDICATION: INDEPENDENT

## 2025-04-24 NOTE — PROGRESS NOTES
Patient here for a medicare wellness visit and follow up    Subjective   Patient ID: Natalee Clemens is a 74 y.o. female who presents for Follow-up and Medicare Annual Wellness Visit Subsequent.  She is generally doing well today and has no complaints.    The patient has undergone cataract surgery in both eyes in 3/2025 and 4/2025 with  from Ophthalmology.  She tolerated both procedures well, and is pleased with the results.  The patient denies any vision changes.    The patient is scheduled for an upcoming MOHS procedure in 5/2025 with  from Dermatology.  She will be having a lesion on her neck removed.    The patient is maintained with omeprazole 40mg once daily as well as occasional Gas-X, and finds that this regimen is controlling symptoms well. She denies any significant heartburn.    The patient denies any dyspnea, chest pressure, chest pain, abdominal pain, hematochezia, melena, bowel problems, or joint pain.       Review of Systems   Eyes:  Negative for visual disturbance.   Respiratory:  Negative for shortness of breath.    Cardiovascular:  Negative for chest pain.   Gastrointestinal:  Negative for abdominal pain, blood in stool, constipation and diarrhea.   Musculoskeletal:  Negative for arthralgias.   Skin:         Positive for skin lesion.     Objective   Physical Exam  Constitutional:       Appearance: Normal appearance.   Neck:      Vascular: No carotid bruit.   Cardiovascular:      Rate and Rhythm: Normal rate and regular rhythm.      Heart sounds: Normal heart sounds.   Pulmonary:      Effort: Pulmonary effort is normal.      Breath sounds: Normal breath sounds.   Abdominal:      General: Bowel sounds are normal.      Palpations: Abdomen is soft.      Tenderness: There is no abdominal tenderness.   Skin:     General: Skin is warm and dry.   Neurological:      General: No focal deficit present.      Mental Status: She is alert and oriented to person, place, and time. Mental status  is at baseline.   Psychiatric:         Mood and Affect: Mood normal.         Behavior: Behavior normal.         Assessment/Plan   Problem List Items Addressed This Visit    None  Visit Diagnoses         Codes      Primary hypertension    -  Primary I10    Relevant Orders    ECG 12 lead (Clinic Performed) (Completed)    Lipid panel    CBC and Auto Differential    Comprehensive metabolic panel            Medicare Wellness Examination Done  -  Discussed healthy diet and regular exercise.    -  Physical exam overall unremarkable. Immunizations reviewed and updated accordingly. Healthy lifestyle choices discussed (tobacco avoidance, appropriate alcohol use, avoidance of illicit substances).   -  Patient is wearing seatbelt.   -  Screening lab work ordered as indicated.    -  Age appropriate screening tests reviewed with patient.       EKG unremarkable compared to previous.    IMPRESSIONS/PLAN:      /80 today in office.  Values at home have been within the normal range.      HLD   - Cholesterol high per 4/2023 lipid panel, patient advised to continue to lower levels through healthy diet and regular physical activity.      Memory Loss  - Ongoing.  Seems only short term.  Scored 30/30 on MMSE per 7/2023.  Ordered referral to Adult Neuropsychology.    Hx of Breast Cancer   - s/p right lumpectomy.  Last Mammogram 10/2022 showed no evidence of malignancy.  MRI breast 4/2022 showed suspicious 1.5cm mass at lumpectomy surgical bed with additional suspicious mass anterior at middle depth.  Previously followed with FELISHA Canales-CNP in Oncology.  Following with  from Obstetrics/Gynecology.    GERD   - EGD 3/2022, biopsies negative, symptoms stable on omeprazole 40mg QD.     Foot Pain   - Following with .     Health Maintenance   - Routine labs ordered including CBC, CMP, and a lipid panel to be completed in the fasting state.  Last DEXA 4/2024.  Last Mammogram 10/2023, repeat pending for 2025. Last  colonoscopy performed 4/2018, due for repeat 2028.  Advised the patient to receive Shingrix series and Prevnar-20 separately through the pharmacy, and discussed adverse effects.       Follow up in 6 months, call sooner if needed.        Scribe Attestation  By signing my name below, I, Jessica Estrada, Robert   attest that this documentation has been prepared under the direction and in the presence of Vignesh Martinez DO.   Jessica Estrada 04/24/25 8:02 AM

## 2025-04-25 LAB
ALBUMIN SERPL-MCNC: 4.3 G/DL (ref 3.6–5.1)
ALP SERPL-CCNC: 66 U/L (ref 37–153)
ALT SERPL-CCNC: 17 U/L (ref 6–29)
ANION GAP SERPL CALCULATED.4IONS-SCNC: 7 MMOL/L (CALC) (ref 7–17)
AST SERPL-CCNC: 27 U/L (ref 10–35)
BASOPHILS # BLD AUTO: 31 CELLS/UL (ref 0–200)
BASOPHILS NFR BLD AUTO: 0.9 %
BILIRUB SERPL-MCNC: 0.6 MG/DL (ref 0.2–1.2)
BUN SERPL-MCNC: 21 MG/DL (ref 7–25)
CALCIUM SERPL-MCNC: 9.2 MG/DL (ref 8.6–10.4)
CHLORIDE SERPL-SCNC: 96 MMOL/L (ref 98–110)
CHOLEST SERPL-MCNC: 258 MG/DL
CHOLEST/HDLC SERPL: 2.3 (CALC)
CO2 SERPL-SCNC: 31 MMOL/L (ref 20–32)
CREAT SERPL-MCNC: 0.7 MG/DL (ref 0.6–1)
EGFRCR SERPLBLD CKD-EPI 2021: 90 ML/MIN/1.73M2
EOSINOPHIL # BLD AUTO: 102 CELLS/UL (ref 15–500)
EOSINOPHIL NFR BLD AUTO: 3 %
ERYTHROCYTE [DISTWIDTH] IN BLOOD BY AUTOMATED COUNT: 11.6 % (ref 11–15)
GLUCOSE SERPL-MCNC: 99 MG/DL (ref 65–99)
HCT VFR BLD AUTO: 41.5 % (ref 35–45)
HDLC SERPL-MCNC: 112 MG/DL
HGB BLD-MCNC: 13.7 G/DL (ref 11.7–15.5)
LDLC SERPL CALC-MCNC: 134 MG/DL (CALC)
LYMPHOCYTES # BLD AUTO: 1268 CELLS/UL (ref 850–3900)
LYMPHOCYTES NFR BLD AUTO: 37.3 %
MCH RBC QN AUTO: 34.4 PG (ref 27–33)
MCHC RBC AUTO-ENTMCNC: 33 G/DL (ref 32–36)
MCV RBC AUTO: 104.3 FL (ref 80–100)
MONOCYTES # BLD AUTO: 405 CELLS/UL (ref 200–950)
MONOCYTES NFR BLD AUTO: 11.9 %
NEUTROPHILS # BLD AUTO: 1595 CELLS/UL (ref 1500–7800)
NEUTROPHILS NFR BLD AUTO: 46.9 %
NONHDLC SERPL-MCNC: 146 MG/DL (CALC)
PLATELET # BLD AUTO: 242 THOUSAND/UL (ref 140–400)
PMV BLD REES-ECKER: 10.7 FL (ref 7.5–12.5)
POTASSIUM SERPL-SCNC: 4.7 MMOL/L (ref 3.5–5.3)
PROT SERPL-MCNC: 6.4 G/DL (ref 6.1–8.1)
RBC # BLD AUTO: 3.98 MILLION/UL (ref 3.8–5.1)
SODIUM SERPL-SCNC: 134 MMOL/L (ref 135–146)
TRIGL SERPL-MCNC: 40 MG/DL
WBC # BLD AUTO: 3.4 THOUSAND/UL (ref 3.8–10.8)

## 2025-04-30 ENCOUNTER — APPOINTMENT (OUTPATIENT)
Dept: DERMATOLOGY | Facility: CLINIC | Age: 75
End: 2025-04-30
Payer: MEDICARE

## 2025-05-06 ENCOUNTER — APPOINTMENT (OUTPATIENT)
Dept: DERMATOLOGY | Facility: CLINIC | Age: 75
End: 2025-05-06
Payer: MEDICARE

## 2025-05-06 VITALS — DIASTOLIC BLOOD PRESSURE: 78 MMHG | SYSTOLIC BLOOD PRESSURE: 162 MMHG | HEART RATE: 62 BPM

## 2025-05-06 DIAGNOSIS — C44.41 BASAL CELL CARCINOMA (BCC) OF SKIN OF NECK: ICD-10-CM

## 2025-05-06 PROCEDURE — 17311 MOHS 1 STAGE H/N/HF/G: CPT | Performed by: DERMATOLOGY

## 2025-05-06 PROCEDURE — 13132 CMPLX RPR F/C/C/M/N/AX/G/H/F: CPT | Performed by: DERMATOLOGY

## 2025-05-06 NOTE — PROGRESS NOTES
Office Visit Note  Date: 5/6/2025  Surgeon:  Vito Lau MD PhD  Office Location: [address] [address]  Dept: [phone]  Dept Fax: [phone]  Referring Provider: Hannah Sanderson MD  [address] [facility] [address], [address],  [id number]    Subjective   Natalee Clemens is a 74 y.o. female who presents for the following: MOHS Surgery    According to the patient, the lesion has been present for approximately 6 months at the time of diagnosis.  The lesion is itchy.  The lesion has not been treated previously.    The patient does not have a pacemaker / defibrillator.  The patient does not have a heart valve / joint replacement.    The patient is not on blood thinners.  The patient does not have a history of hepatitis B or C.  The patient does not have a history of HIV.  The patient does not have a history of immunosuppression (e.g. organ transplantation, malignancy, medications)    Review of Systems:  No other skin or systemic complaints other than what is documented elsewhere in the note.    MEDICAL HISTORY: clinically relevant history including significant past medical history, medications and allergies was reviewed and documented in Epic.    Objective   Well appearing patient in no apparent distress; mood and affect are within normal limits.  Vital signs: See record.  Noted on the   Left Anterior Neck  Is a 1.2 x 0.4 cm scar      The patient confirmed the identified site.    Discussion:  The nature of the diagnosis was explained. The lesion is a skin cancer.  It has a risk of local growth and distant spread. The condition is associated with sun exposure.  Warning signs of non-melanoma skin cancer discussed. Patient was instructed to perform monthly self skin examination.  We recommended that the patient have regular full skin exams given an increased risk of subsequent skin cancers. The patient was instructed to use sun protective  This encounter was created in error - please disregard  behaviors including use of broad spectrum sunscreens and sun protective clothing to reduce risk of skin cancers.      Risks, benefits, side effects of Mohs surgery were discussed with patient and the patient voiced understanding.  It was explained that even though the cure rate of Mohs is very high it is not 100%. Risks of surgery including but not limited to bleeding, infection, numbness, nerve damage, and scar were reviewed.  Discussion included wound care requirements, activity restrictions, likely scar outcome and time to heal.     After Mohs surgery, the defect may need to be repaired surgically and the scar may be longer than the original lesion.  Reconstruction options, risks, and benefits were reviewed including second intention healing, linear repair (4-1 ratio was explained), local flaps, skin grafts, cartilage grafts and interpolation flaps (the need for multiple surgeries was explained). Possible outcomes were reviewed including likely scar appearance, failure of flap survival, infection, bleeding and the need for revision surgery.     The pathology was reviewed, the photograph was reviewed, and the referring physician's note was reviewed.    Patient elected for Mohs surgery.

## 2025-05-06 NOTE — PROGRESS NOTES
Mohs Surgery Operative Note    Date of Surgery:  5/6/2025  Surgeon:  Vito Lau MD PhD  Office Location: [address] [address]  Dept: [phone]  Dept Fax: [phone]  Referring Provider: Hannah Sanderson MD  [address] [place] [address], [place] [id number]      Assessment/Plan   Pre-procedure:   Obtained informed consent: written from patient  The surgical site was identified and confirmed with the patient.     Intra-operative:   Audible time out called at : 11:57 AM 05/06/25  by: Trisha Rosenberg RN   Verified patient name, birthdate, site, specimen bottle label & requisition.    The planned procedure(s) was again reviewed with the patient. The risks of bleeding, infection, nerve damage and scarring were reviewed. Written authorization was obtained. The patient identity, surgical site, and planned procedure(s) were verified. The provider acted as both surgeon and pathologist.     BASAL CELL CARCINOMA (BCC) OF SKIN OF NECK  Left Anterior Neck  Mohs surgery    Consent obtained: written    Universal Protocol:  Procedure explained and questions answered to patient or proxy's satisfaction: Yes    Test results available and properly labeled: Yes    Pathology report reviewed: Yes    External notes reviewed: Yes    Photo or diagram used for site identification: Yes    Site/side marked: Yes    Slide independently reviewed by Mohs surgeon: Yes    Immediately prior to procedure a time out was called: Yes    Patient identity confirmed: verbally with patient  Preparation: Patient was prepped and draped in usual sterile fashion      Anticoagulation:  Is the patient taking prescription anticoagulant and/or aspirin prescribed/recommended by a physician? No    Was the anticoagulation regimen changed prior to Mohs? No      Anesthesia:  Anesthesia method: local infiltration  Local anesthetic: lidocaine 1% WITH epi    Procedure Details:  Case ID Number:  [id number]  Biopsy accession number: [id number]  Date of biopsy: 2/25/2025  Frozen section biopsy performed: No    Specimen debulked: Yes (nBCC)    Pre-Op diagnosis: basal cell carcinoma  BCC subtype: nodular and infiltrative  Surgical site (from skin exam): Left Anterior Neck  Pre-operative length (cm): 1.2  Pre-operative width (cm): 0.4  Indications for Mohs surgery: anatomic location where tissue conservation is critical  Previously treated? No      Micrographic Surgery Details:  Post-operative length (cm): 2  Post-operative width (cm): 2  Number of Mohs stages: 1    Stage 1     Comments: The patient was brought into the operating room and placed in the procedure chair in the appropriate position.  The area positive by previous biopsy was identified and confirmed with the patient. The area of clinically obvious tumor was debulked using a curette and/or scalpel as needed. An incision was made following the Mohs approach through the skin. The specimen was taken to the lab, divided into 2 piece(s) and appropriately chromacoded and processed.                 Tumor features identified on Mohs section: no tumor identified    Depth of defect: subcutaneous fat    Patient tolerance of procedure: tolerated well, no immediate complications    Reconstruction:  Was the defect reconstructed? Yes    Was reconstruction performed by the same Mohs surgeon? Yes    Setting of reconstruction: outpatient office  When was reconstruction performed? same day  Type of reconstruction: linear  Linear reconstruction: complex  Length of linear repair (cm): 4  Subcutaneous Layers (Deep Stitches)   Suture size:  4-0  Suture type:  Vicryl  Stitches:  Buried vertical mattress  Fine/surface layer approximation (top stitches)   Epidermal/Superficial suture size:  5-0  Epidermal/Superficial suture type:  Fast-absorbing gut  Stitches: simple running    Hemostasis achieved with: electrodesiccation  Outcome: patient tolerated procedure well with no  complications    Post-procedure details: sterile dressing applied and wound care instructions given    Dressing type: Hypafix, pressure dressing, petrolatum and Telfa pad      Complex Linear Repair - Wide Undermining:  Given the location and size of the defect, it was determined that a complex layered linear closure was required to restore normal anatomy and function. The repair was considered complex because extensive undermining was required and performed. The amount of undermining performed was greater than the maximum width of the defect as measured perpendicular to the closure line along at least one entire edge of the defect. Standing cutaneous cones were removed using Burow's triangles. The wound edges were brought into close approximation with buried vertical mattress sutures. The remainder of the wound was then closed with epidermal top sutures.              The final repair measured 4 cm    Wound care was discussed, and the patient was given written post-operative wound care instructions.      The patient will follow up with Vito Lau MD PhD as needed for any post operative problems or concerns, and will follow up with their primary dermatologist as scheduled.

## 2025-05-13 ENCOUNTER — HOSPITAL ENCOUNTER (OUTPATIENT)
Dept: RADIOLOGY | Facility: CLINIC | Age: 75
Discharge: HOME | End: 2025-05-13
Payer: MEDICARE

## 2025-05-13 VITALS — HEIGHT: 61 IN | WEIGHT: 111 LBS | BODY MASS INDEX: 20.96 KG/M2

## 2025-05-13 DIAGNOSIS — Z12.31 ENCOUNTER FOR SCREENING MAMMOGRAM FOR MALIGNANT NEOPLASM OF BREAST: ICD-10-CM

## 2025-05-13 PROCEDURE — 77063 BREAST TOMOSYNTHESIS BI: CPT | Performed by: RADIOLOGY

## 2025-05-13 PROCEDURE — 77067 SCR MAMMO BI INCL CAD: CPT

## 2025-05-13 PROCEDURE — 77067 SCR MAMMO BI INCL CAD: CPT | Performed by: RADIOLOGY

## 2025-05-21 ENCOUNTER — TELEPHONE (OUTPATIENT)
Dept: DERMATOLOGY | Facility: CLINIC | Age: 75
End: 2025-05-21
Payer: MEDICARE

## 2025-05-21 NOTE — TELEPHONE ENCOUNTER
Pt called reporting that she thinks sutures are not dissolving. Denies feeling any sutures on top, but reports that area feels lumpy. Educated pt that she has deep sutures that can take several weeks to fully dissolve and as she continues to heal/deep sutures dissolve area will flatten out. Educated pt to wait till week 8 to eval scar and if not satisfied call office and schedule appt to discuss scar treatment options with Dr. Lau. Pt requests copy of consent form, she is coming in for appt in June, educated her to remind someone if she would like copy at that time.

## 2025-06-19 ENCOUNTER — APPOINTMENT (OUTPATIENT)
Dept: DERMATOLOGY | Facility: CLINIC | Age: 75
End: 2025-06-19
Payer: MEDICARE

## 2025-06-19 DIAGNOSIS — Z85.828 PERSONAL HISTORY OF SKIN CANCER: ICD-10-CM

## 2025-06-19 DIAGNOSIS — L90.5 SCAR CONDITIONS AND FIBROSIS OF SKIN: Primary | ICD-10-CM

## 2025-06-19 DIAGNOSIS — L82.1 SEBORRHEIC KERATOSIS: ICD-10-CM

## 2025-06-19 DIAGNOSIS — D18.01 HEMANGIOMA OF SKIN: ICD-10-CM

## 2025-06-19 DIAGNOSIS — L81.4 LENTIGO: ICD-10-CM

## 2025-06-19 PROCEDURE — 99213 OFFICE O/P EST LOW 20 MIN: CPT | Performed by: DERMATOLOGY

## 2025-06-19 PROCEDURE — 1159F MED LIST DOCD IN RCRD: CPT | Performed by: DERMATOLOGY

## 2025-06-19 RX ORDER — TRIAMCINOLONE ACETONIDE 1 MG/G
CREAM TOPICAL 2 TIMES DAILY
COMMUNITY

## 2025-06-19 NOTE — Clinical Note
There is no evidence of recurrence on clinical examination today, reassurance was provided to the patient. Discussed that hx of skin cancer increases risk of developing future skin cancer.

## 2025-06-19 NOTE — PROGRESS NOTES
Subjective     Natalee Clemens is a 74 y.o. female who presents for the following: Skin Check (Annual. Hx of BCC and AK. Area of concern under left breast. Pt would also like to discuss the milia on her nose and have her moh's scar checked (left neck)).     Skin Cancer Screening  She has a history of moderate sun exposure. She is in the sun daily. She uses sunscreen rarely. She reports no skin symptoms. Her moles are growing, itching, and tender.    Spots that concern her: under left breast     Hx of BCC - 25, 17, 16  AK    Review of Systems:  No other skin or systemic complaints other than what is documented elsewhere in the note.    The following portions of the chart were reviewed this encounter and updated as appropriate:       Skin Cancer History  Biopsy Log Book  No skin cancers from Specimen Tracking.    Additional History      Specialty Problems          Dermatology Problems    Basal cell carcinoma    Left Chest; ED&C 2016         Basal cell carcinoma (BCC) of right forehead    Mohs by Dr simmons          History of skin cancer    Plantar fat pad atrophy     Past Medical History:  Natalee Clemens  has a past medical history of Basal cell carcinoma (2016), Basal cell carcinoma (BCC) of right forehead (2017), Eczema (), Migraine (), and Verruca (1995).    Past Surgical History:  Natalee Clemens  has a past surgical history that includes Breast lumpectomy (2016); Colonoscopy (2018); Skin biopsy (2016); Mohs surgery;  section, low transverse (10/22/1988); and Breast biopsy ().    Family History:  Patient family history includes Alzheimer's disease in her father; Depression in her sister; Diabetes in her father; Eczema in her mother; Epilepsy in her sister; Macular degeneration in her father and mother; Mental illness in her paternal grandmother and sister; No Known Problems in her brother; Rashes / Skin problems in her mother; anoxic brain  injury in her brother; cardiac disorder in her mother.       Objective   Well appearing patient in no apparent distress; mood and affect are within normal limits.    A full examination was performed including scalp, head, eyes, ears, nose, lips, neck, chest, axillae, abdomen, back, buttocks, bilateral upper extremities, bilateral lower extremities, hands, feet, fingers, toes, fingernails, and toenails. All findings within normal limits unless otherwise noted below.    Assessment/Plan   Skin Exam  1. HEMANGIOMA OF SKIN  Generalized  Scattered cherry-red papule(s).  2. PERSONAL HISTORY OF SKIN CANCER  Generalized  Scar at site of prior procedure  Left chest and right forehead   NER  There is no evidence of recurrence on clinical examination today, reassurance was provided to the patient. Discussed that hx of skin cancer increases risk of developing future skin cancer.  3. SCAR CONDITIONS AND FIBROSIS OF SKIN  Generalized  Scar is well-healed without evidence of recurrence  4. SEBORRHEIC KERATOSIS (3)  Generalized, Mid Back, Neck - Anterior  Stuck on verrucous, tan-brown papules and plaques.    The benign nature of these skin lesions were reviewed with the patient today and reassurance was provided. Patient advised to return for f/u if the lesions change in size, shape, color, become painful, tender, itch or bleed.    ISK x 2 on anterior neck and L inframammary treated with LN2 x 3 cycles per pt request; wound care d/w pt and pt expressed understanding.   5. LENTIGO  Generalized  Scattered tan macules in sun-exposed areas.  The ABCDEs of melanoma and warning signs of non-melanoma skin cancer were discussed with patient and patient expressed understanding. Sun protection and use of at least SPF 30 discussed with patient. Pt instructed to reapply every 2 hours.      Follow up 6 months or sooner as needed.

## 2025-06-19 NOTE — Clinical Note
The benign nature of these skin lesions were reviewed with the patient today and reassurance was provided. Patient advised to return for f/u if the lesions change in size, shape, color, become painful, tender, itch or bleed.    ISK x 2 on anterior neck and L inframammary treated with LN2 x 3 cycles per pt request; wound care d/w pt and pt expressed understanding.

## 2025-07-29 DIAGNOSIS — K21.9 GASTROESOPHAGEAL REFLUX DISEASE WITHOUT ESOPHAGITIS: ICD-10-CM

## 2025-07-29 RX ORDER — OMEPRAZOLE 40 MG/1
40 CAPSULE, DELAYED RELEASE ORAL
Qty: 90 CAPSULE | Refills: 2 | Status: SHIPPED | OUTPATIENT
Start: 2025-07-29

## 2025-08-30 ENCOUNTER — OFFICE VISIT (OUTPATIENT)
Dept: URGENT CARE | Age: 75
End: 2025-08-30
Payer: MEDICARE

## 2025-08-30 ENCOUNTER — HOSPITAL ENCOUNTER (EMERGENCY)
Facility: HOSPITAL | Age: 75
Discharge: HOME | End: 2025-08-30
Attending: EMERGENCY MEDICINE
Payer: MEDICARE

## 2025-08-30 ENCOUNTER — HOSPITAL ENCOUNTER (EMERGENCY)
Facility: HOSPITAL | Age: 75
Discharge: HOME | End: 2025-08-30
Payer: MEDICARE

## 2025-08-30 VITALS
DIASTOLIC BLOOD PRESSURE: 80 MMHG | WEIGHT: 110 LBS | HEART RATE: 63 BPM | TEMPERATURE: 97.7 F | BODY MASS INDEX: 20.24 KG/M2 | RESPIRATION RATE: 20 BRPM | SYSTOLIC BLOOD PRESSURE: 155 MMHG | OXYGEN SATURATION: 99 % | HEIGHT: 62 IN

## 2025-08-30 DIAGNOSIS — H57.9 LEFT EYE COMPLAINT: Primary | ICD-10-CM

## 2025-08-30 ASSESSMENT — VISUAL ACUITY
OD: 20/15
OS: 20/50
OD_CC: 20/20
OS: 20/200
OD: 20/10

## 2025-08-30 ASSESSMENT — PAIN SCALES - GENERAL
PAINLEVEL_OUTOF10: 0 - NO PAIN
PAINLEVEL_OUTOF10: 2

## 2025-08-30 ASSESSMENT — TONOMETRY
OS_IOP_MMHG: 12
IOP_HANDHELD: 1
OD_IOP_MMHG: 12

## 2025-08-30 ASSESSMENT — LIFESTYLE VARIABLES
TOTAL SCORE: 0
HAVE PEOPLE ANNOYED YOU BY CRITICIZING YOUR DRINKING: NO
EVER FELT BAD OR GUILTY ABOUT YOUR DRINKING: NO
EVER HAD A DRINK FIRST THING IN THE MORNING TO STEADY YOUR NERVES TO GET RID OF A HANGOVER: NO
EVER FELT BAD OR GUILTY ABOUT YOUR DRINKING: NO
HAVE YOU EVER FELT YOU SHOULD CUT DOWN ON YOUR DRINKING: NO
HAVE PEOPLE ANNOYED YOU BY CRITICIZING YOUR DRINKING: NO
EVER HAD A DRINK FIRST THING IN THE MORNING TO STEADY YOUR NERVES TO GET RID OF A HANGOVER: NO
TOTAL SCORE: 0
HAVE YOU EVER FELT YOU SHOULD CUT DOWN ON YOUR DRINKING: NO

## 2025-08-30 ASSESSMENT — PAIN - FUNCTIONAL ASSESSMENT
PAIN_FUNCTIONAL_ASSESSMENT: 0-10
PAIN_FUNCTIONAL_ASSESSMENT: 0-10

## 2025-08-30 ASSESSMENT — PAIN DESCRIPTION - LOCATION: LOCATION: EYE

## 2025-08-30 ASSESSMENT — PAIN DESCRIPTION - PAIN TYPE: TYPE: ACUTE PAIN

## 2025-08-30 ASSESSMENT — PAIN DESCRIPTION - ORIENTATION: ORIENTATION: LEFT

## 2026-01-15 ENCOUNTER — APPOINTMENT (OUTPATIENT)
Dept: DERMATOLOGY | Facility: CLINIC | Age: 76
End: 2026-01-15
Payer: MEDICARE

## 2026-04-28 ENCOUNTER — APPOINTMENT (OUTPATIENT)
Dept: PRIMARY CARE | Facility: CLINIC | Age: 76
End: 2026-04-28
Payer: MEDICARE